# Patient Record
Sex: FEMALE | Race: WHITE | NOT HISPANIC OR LATINO | Employment: OTHER | URBAN - METROPOLITAN AREA
[De-identification: names, ages, dates, MRNs, and addresses within clinical notes are randomized per-mention and may not be internally consistent; named-entity substitution may affect disease eponyms.]

---

## 2018-06-05 ENCOUNTER — TELEPHONE (OUTPATIENT)
Dept: SCHEDULING | Facility: CLINIC | Age: 68
End: 2018-06-05

## 2018-06-06 ENCOUNTER — OFFICE VISIT (OUTPATIENT)
Dept: CARDIOLOGY | Facility: CLINIC | Age: 68
End: 2018-06-06
Payer: COMMERCIAL

## 2018-06-06 VITALS
WEIGHT: 244.4 LBS | SYSTOLIC BLOOD PRESSURE: 120 MMHG | RESPIRATION RATE: 16 BRPM | BODY MASS INDEX: 43.3 KG/M2 | DIASTOLIC BLOOD PRESSURE: 87 MMHG | HEIGHT: 63 IN | HEART RATE: 75 BPM

## 2018-06-06 DIAGNOSIS — R06.09 DYSPNEA ON EXERTION: ICD-10-CM

## 2018-06-06 DIAGNOSIS — Z01.810 PREOP CARDIOVASCULAR EXAM: ICD-10-CM

## 2018-06-06 DIAGNOSIS — R00.2 PALPITATION: Primary | ICD-10-CM

## 2018-06-06 DIAGNOSIS — I10 ESSENTIAL HYPERTENSION: ICD-10-CM

## 2018-06-06 PROCEDURE — 93000 ELECTROCARDIOGRAM COMPLETE: CPT | Performed by: INTERNAL MEDICINE

## 2018-06-06 PROCEDURE — 99215 OFFICE O/P EST HI 40 MIN: CPT | Performed by: INTERNAL MEDICINE

## 2018-06-06 RX ORDER — METFORMIN HYDROCHLORIDE 500 MG/1
500 TABLET ORAL
COMMUNITY
Start: 2017-02-24 | End: 2021-04-07

## 2018-06-06 RX ORDER — ESTRADIOL 0.1 MG/G
1 CREAM VAGINAL
COMMUNITY
Start: 2014-12-09 | End: 2019-01-23

## 2018-06-06 RX ORDER — NAPROXEN 500 MG/1
1 TABLET ORAL DAILY
Refills: 3 | COMMUNITY
Start: 2018-03-12 | End: 2024-08-07 | Stop reason: ALTCHOICE

## 2018-06-06 RX ORDER — OMEPRAZOLE 40 MG/1
1 CAPSULE, DELAYED RELEASE ORAL DAILY
Refills: 3 | COMMUNITY
Start: 2018-05-17 | End: 2019-01-23

## 2018-06-06 RX ORDER — ACETAMINOPHEN 500 MG
1 TABLET ORAL DAILY
COMMUNITY
Start: 2014-12-09

## 2018-06-06 RX ORDER — MULTIVITAMIN
TABLET ORAL
COMMUNITY
Start: 2017-01-25 | End: 2018-10-31

## 2018-06-06 RX ORDER — TAMSULOSIN HYDROCHLORIDE 0.4 MG/1
0.4 CAPSULE ORAL NIGHTLY
Refills: 2 | COMMUNITY
Start: 2018-05-07 | End: 2018-10-31

## 2018-06-06 RX ORDER — URSODIOL 250 MG/1
250 TABLET, FILM COATED ORAL DAILY
COMMUNITY
Start: 2014-12-09 | End: 2018-10-31

## 2018-06-06 RX ORDER — NITROFURANTOIN (MACROCRYSTALS) 100 MG/1
1 CAPSULE ORAL DAILY
Refills: 3 | COMMUNITY
Start: 2018-03-12 | End: 2018-10-31

## 2018-06-06 RX ORDER — ARMODAFINIL 150 MG/1
150 TABLET ORAL DAILY PRN
COMMUNITY

## 2018-06-06 RX ORDER — VITAMIN E 268 MG
1 CAPSULE ORAL DAILY
COMMUNITY
Start: 2014-12-09

## 2018-06-06 RX ORDER — FUROSEMIDE 20 MG/1
20 TABLET ORAL
Refills: 5 | COMMUNITY
Start: 2018-05-23

## 2018-06-06 RX ORDER — LOSARTAN POTASSIUM 50 MG/1
50 TABLET ORAL DAILY
COMMUNITY
Start: 2015-12-15 | End: 2018-10-31

## 2018-06-06 RX ORDER — EZETIMIBE 10 MG/1
10 TABLET ORAL
Refills: 2 | COMMUNITY
Start: 2018-03-12 | End: 2021-04-07

## 2018-06-06 RX ORDER — METHENAMINE HIPPURATE 1000 MG/1
1 TABLET ORAL EVERY EVENING
Refills: 3 | COMMUNITY
Start: 2018-05-09 | End: 2021-04-07

## 2018-06-06 NOTE — ASSESSMENT & PLAN NOTE
She is slated 6-25-18 for removal of vaginal mesh, vaginoplasty, and possible laparotomy at [facility].  As far she understands the procedure will be performed with general anesthesia and will hopefully be an outpatient procedure.  She is at low cardiovascular risk and should proceed with routine perioperative monitoring.  Her stress test and echocardiogram in 2017 showed normal left ventricular function, no aortic stenosis, and no significant ischemia.

## 2018-06-06 NOTE — PROGRESS NOTES
Cardiology Outpatient Note       Siria Mendieta is a 68 y.o. female who was seen previously in 2017 for palpitations and dyspnea on exertion.  Her symptoms essentially subsided and her cardiac testing which included an echocardiogram and stress test at that time were unremarkable.    She has had a number of urinary issues and is here today because she is slated for removal of vaginal mesh, vaginoplasty, and possible laparotomy at [facility].  Her current procedure date is 18.  In the process of being treated for her urologic issues, she was given a course of Keflex which was stopped after she had a reaction which consisted primarily of a rash on her right lower extremity.  Her skin has become more indurated.  She was concerned that she may be developing other changes in her lower extremities but she does not have pitting edema or any calf tenderness.    She still does have shortness of breath when she gets up and does things but this is unchanged and likely related to deconditioning.                                                         PMH     Medical History:  Past Medical History:   Diagnosis Date   • Diabetes (CMS/HCC) (HCC)    • Fatty liver     with cirrohsis   • Female bladder prolapse    • Fibromyalgia    • Hypertension    • Lipid disorder    • Liver fibrosis (CMS/HCC)    • Sleep apnea     CPAP       Surgical History:  Past Surgical History:   Procedure Laterality Date   • CATARACT EXTRACTION, BILATERAL     • HIP FRACTURE SURGERY Right    • HYSTEROSCOPY     • INCONTINENCE SURGERY  10/2014   • REPLACEMENT TOTAL KNEE Right        Social History: reports that she has never smoked. She has never used smokeless tobacco. She reports that she does not drink alcohol or use drugs.    Family History: indicated that her mother is . She indicated that her father is . She indicated that the status of her brother is unknown. She indicated that her other is alive.        Allergies:Acetaminophen; Aspirin; Levofloxacin; Meperidine hcl; Oxycodone hcl; Penicillins; Sulfa (sulfonamide antibiotics); and Vancomycin    Current Medications:  Current Outpatient Prescriptions:   •  armodafinil (NUVIGIL) 150 mg tablet, Take 150 mg by mouth daily as needed.  , Disp: , Rfl:   •  cholecalciferol, vitamin D3, (VITAMIN D3) 5,000 unit tablet, Take 1 tablet by mouth daily., Disp: , Rfl:   •  D-MANNOSE ORAL, Take 1 capsule by mouth daily., Disp: , Rfl:   •  ezetimibe (ZETIA) 10 mg tablet, Take 10 mg by mouth once daily., Disp: , Rfl: 2  •  furosemide (LASIX) 20 mg tablet, Take 20 mg by mouth once daily., Disp: , Rfl: 5  •  losartan (COZAAR) 50 mg tablet, Take 50 mg by mouth daily., Disp: , Rfl:   •  metFORMIN (GLUCOPHAGE) 500 mg tablet, 500 mg daily with breakfast.  , Disp: , Rfl:   •  methenamine (HIPREX) 1 gram tablet, every evening., Disp: , Rfl: 3  •  multivitamin (THERAGRAN) tablet, No SIG Entered, Disp: , Rfl:   •  naproxen (NAPROSYN) 500 mg tablet, Take 1 tablet by mouth every 12 (twelve) hours as needed., Disp: , Rfl: 3  •  nitrofurantoin (MACRODANTIN) 100 mg capsule, Take 1 capsule by mouth daily., Disp: , Rfl: 3  •  omeprazole (PriLOSEC) 40 mg capsule, Take 1 capsule by mouth daily. Before a meal, Disp: , Rfl: 3  •  tamsulosin (FLOMAX) 0.4 mg capsule, Take 0.4 mg by mouth nightly., Disp: , Rfl: 2  •  vitamin E 400 unit capsule, Take 1 capsule by mouth daily., Disp: , Rfl:   •  vortioxetine (TRINTELLIX) 5 mg tablet, Take 5 mg by mouth daily. Same time each day, Disp: , Rfl:   •  ascorbate calcium (VITAMIN C ORAL), No SIG Entered, Disp: , Rfl:   •  aspirin-acetaminophen-caffeine (EXCEDRIN EXTRA STRENGTH) 250-250-65 mg per tablet, Take 4 tablets by mouth daily., Disp: , Rfl:   •  estradiol (ESTRACE) 0.01 % (0.1 mg/gram) vaginal cream, Insert 1 g into the vagina every 14 (fourteen) days., Disp: , Rfl:   •  MILK THISTLE ORAL, No SIG Entered, Disp: , Rfl:   •  oxybutynin chloride (GELNIQUE)  "10 % (100 mg/gram) gel in packet, apply 1 sachet by topical route  every day to dry, intact skin on the abdomen, upper arms/shoulders or thighs rotating application site daily., Disp: , Rfl:   •  ursodiol (MICK 250) 250 mg tablet, Take 250 mg by mouth daily., Disp: , Rfl:                                                       OBJECTIVE   ROS   Constitution: Negative for chills and fever.   Eyes: Negative for blurred vision and visual disturbance.   Cardiovascular: Negative for chest pain, near-syncope, palpitations and syncope.   Respiratory: Negative for hemoptysis, dyspnea on exertion unchanged  Hematologic/Lymphatic: Negative for bleeding problem.   Skin: Negative for rash. No new skin changes  Gastrointestinal: Negative for abdominal pain, diarrhea, hematochezia, melena, nausea and vomiting.   Genitourinary: Negative for hematuria.   Neurological: Negative for headaches.      Vitals:    06/06/18 1441   BP: 120/87   BP Location: Left upper arm   Patient Position: Sitting   Pulse: 75   Resp: 16   Weight: 111 kg (244 lb 6.4 oz)   Height: 1.6 m (5' 3\")       BP Readings from Last 3 Encounters:   06/06/18 120/87     Wt Readings from Last 3 Encounters:   06/06/18 111 kg (244 lb 6.4 oz)      Physical Exam   Constitutional:  Oriented to person, place, and time.Well-developed and well-nourished.   HEENT:  Neck Supple.  No JVD No carotid bruits no cervical lymphadenopathy  Head: Normocephalic.   Eyes: Extraocular movements intact  Cardiovascular: Normal rate, regular rhythm and normal heart sounds.  Exam reveals no friction rub.    Pulmonary/Chest: Effort normal and breath sounds normal. No wheezes.  Abdominal: Soft and nontender. Bowel sounds are normal.   Neurological: Alert and oriented to person, place, and time.   Skin: Skin is warm and dry.  Induration skin changes right lower extremity.  No calf tenderness no pitting edema  Psychiatric: Behavior is normal.   Objective   No results found for: WBC, HGB, PLT, CHOL, " TRIG, HDL, LDLDIRECT, ALT, AST, NA, K, CREATININE, TSH, INR, GLUF, HGBA1C  Troponin I Results     No lab values to display.                                                             IMAGING   Transthoracic 2-2017  Summary  There is no prior study for comparison.  1.The left ventricle is normal size. Wall thickness is normal. Left ventricular ejection fraction is estimated at 75%. No regional wall  motion abnormalities.  2. The right ventricle is normal size with normal function.  3. Normal left atrial size.The right atrium is normal size.  4. There is mild posterior annular calcification. The mitral valve leaflets are thin and open well. There is no mitral valve prolapse. There  is trace mitral regurgitation.  5. The aortic root is normal size. There is no aortic regurgitation. The aortic valve is not well seen. The aortic valve opens adequately.  There is no significant aortic stenosis.  6. The tricuspid valve is not well seen but is grossly normal. There is trace tricuspid regurgitation. Estimated RVSP is 27 mmHg.  ·      Pharmacologic nuclear stress test 2-2017  Small area of decreased isotope uptake in the apex on resting images only consistent with artifact. Stress perfusion imaging is normal  without evidence of significant ischemia. Left ventricular function is hyperdynamic greater than 75%.         EKG   Sinus  Rhythm 75bpm  WITHIN NORMAL LIMITS                                           ASSESSMENT AND PLAN   Ms. Mendieta is a 68-year-old female with the following issues:        Assessment/Plan    Preop cardiovascular exam  She is slated 6-25-18 for removal of vaginal mesh, vaginoplasty, and possible laparotomy at [facility].  As far she understands the procedure will be performed with general anesthesia and will hopefully be an outpatient procedure.  She is at low cardiovascular risk and should proceed with routine perioperative monitoring.  Her stress test and echocardiogram in 2017  showed normal left ventricular function, no aortic stenosis, and no significant ischemia.    Essential hypertension  Continue with losartan 50 mg daily and Lasix 20 mg daily.    Dyspnea on exertion  This is partly related to deconditioning.  Her presentation is not suggestive of an acute coronary syndrome or congestive heart failure.  Recommend risk factor modification.  Patient cannot tolerate statin therapy due to her liver cirrhosis.             She will return to the office on an annual basis.  Thank you for allowing me to participate in the care of this patient.  I hope this information is helpful.     Lizette Sifuentes MD [facility]  6/6/2018  5:09 PM

## 2018-06-06 NOTE — LETTER
June 6, 2018     Johnny Gates, DO  [address]  [place] [address]    Patient: Siria Mendieta   YOB: 1950   Date of Visit: 6/6/2018       Dear Dr. aGtes:    Thank you for referring Siria Mendieta to me for evaluation. Below are my notes for this consultation.    If you have questions, please do not hesitate to call me. I look forward to following your patient along with you.         Sincerely,        Lizette Sifuentes MD        CC: MD Lizette Coates MD  6/6/2018  5:11 PM  Signed       Cardiology Outpatient Note       Siria Mendieta is a 68 y.o. female who was seen previously in 2017 for palpitations and dyspnea on exertion.  Her symptoms essentially subsided and her cardiac testing which included an echocardiogram and stress test at that time were unremarkable.    She has had a number of urinary issues and is here today because she is slated for removal of vaginal mesh, vaginoplasty, and possible laparotomy at [facility].  Her current procedure date is 6-25-18.  In the process of being treated for her urologic issues, she was given a course of Keflex which was stopped after she had a reaction which consisted primarily of a rash on her right lower extremity.  Her skin has become more indurated.  She was concerned that she may be developing other changes in her lower extremities but she does not have pitting edema or any calf tenderness.    She still does have shortness of breath when she gets up and does things but this is unchanged and likely related to deconditioning.                                                         PMH     Medical History:  Past Medical History:   Diagnosis Date   • Diabetes (CMS/HCC) (HCC)    • Fatty liver     with cirrohsis   • Female bladder prolapse    • Fibromyalgia    • Hypertension    • Lipid disorder    • Liver fibrosis (CMS/HCC)    • Sleep apnea     CPAP       Surgical History:  Past Surgical History:   Procedure Laterality  Date   • CATARACT EXTRACTION, BILATERAL     • HIP FRACTURE SURGERY Right    • HYSTEROSCOPY     • INCONTINENCE SURGERY  10/2014   • REPLACEMENT TOTAL KNEE Right        Social History: reports that she has never smoked. She has never used smokeless tobacco. She reports that she does not drink alcohol or use drugs.    Family History: indicated that her mother is . She indicated that her father is . She indicated that the status of her brother is unknown. She indicated that her other is alive.       Allergies:Acetaminophen; Aspirin; Levofloxacin; Meperidine hcl; Oxycodone hcl; Penicillins; Sulfa (sulfonamide antibiotics); and Vancomycin    Current Medications:  Current Outpatient Prescriptions:   •  armodafinil (NUVIGIL) 150 mg tablet, Take 150 mg by mouth daily as needed.  , Disp: , Rfl:   •  cholecalciferol, vitamin D3, (VITAMIN D3) 5,000 unit tablet, Take 1 tablet by mouth daily., Disp: , Rfl:   •  D-MANNOSE ORAL, Take 1 capsule by mouth daily., Disp: , Rfl:   •  ezetimibe (ZETIA) 10 mg tablet, Take 10 mg by mouth once daily., Disp: , Rfl: 2  •  furosemide (LASIX) 20 mg tablet, Take 20 mg by mouth once daily., Disp: , Rfl: 5  •  losartan (COZAAR) 50 mg tablet, Take 50 mg by mouth daily., Disp: , Rfl:   •  metFORMIN (GLUCOPHAGE) 500 mg tablet, 500 mg daily with breakfast.  , Disp: , Rfl:   •  methenamine (HIPREX) 1 gram tablet, every evening., Disp: , Rfl: 3  •  multivitamin (THERAGRAN) tablet, No SIG Entered, Disp: , Rfl:   •  naproxen (NAPROSYN) 500 mg tablet, Take 1 tablet by mouth every 12 (twelve) hours as needed., Disp: , Rfl: 3  •  nitrofurantoin (MACRODANTIN) 100 mg capsule, Take 1 capsule by mouth daily., Disp: , Rfl: 3  •  omeprazole (PriLOSEC) 40 mg capsule, Take 1 capsule by mouth daily. Before a meal, Disp: , Rfl: 3  •  tamsulosin (FLOMAX) 0.4 mg capsule, Take 0.4 mg by mouth nightly., Disp: , Rfl: 2  •  vitamin E 400 unit capsule, Take 1 capsule by mouth daily., Disp: , Rfl:   •   "vortioxetine (TRINTELLIX) 5 mg tablet, Take 5 mg by mouth daily. Same time each day, Disp: , Rfl:   •  ascorbate calcium (VITAMIN C ORAL), No SIG Entered, Disp: , Rfl:   •  aspirin-acetaminophen-caffeine (EXCEDRIN EXTRA STRENGTH) 250-250-65 mg per tablet, Take 4 tablets by mouth daily., Disp: , Rfl:   •  estradiol (ESTRACE) 0.01 % (0.1 mg/gram) vaginal cream, Insert 1 g into the vagina every 14 (fourteen) days., Disp: , Rfl:   •  MILK THISTLE ORAL, No SIG Entered, Disp: , Rfl:   •  oxybutynin chloride (GELNIQUE) 10 % (100 mg/gram) gel in packet, apply 1 sachet by topical route  every day to dry, intact skin on the abdomen, upper arms/shoulders or thighs rotating application site daily., Disp: , Rfl:   •  ursodiol (MICK 250) 250 mg tablet, Take 250 mg by mouth daily., Disp: , Rfl:                                                       OBJECTIVE   ROS   Constitution: Negative for chills and fever.   Eyes: Negative for blurred vision and visual disturbance.   Cardiovascular: Negative for chest pain, near-syncope, palpitations and syncope.   Respiratory: Negative for hemoptysis, dyspnea on exertion unchanged  Hematologic/Lymphatic: Negative for bleeding problem.   Skin: Negative for rash. No new skin changes  Gastrointestinal: Negative for abdominal pain, diarrhea, hematochezia, melena, nausea and vomiting.   Genitourinary: Negative for hematuria.   Neurological: Negative for headaches.      Vitals:    06/06/18 1441   BP: 120/87   BP Location: Left upper arm   Patient Position: Sitting   Pulse: 75   Resp: 16   Weight: 111 kg (244 lb 6.4 oz)   Height: 1.6 m (5' 3\")       BP Readings from Last 3 Encounters:   06/06/18 120/87     Wt Readings from Last 3 Encounters:   06/06/18 111 kg (244 lb 6.4 oz)      Physical Exam   Constitutional:  Oriented to person, place, and time.Well-developed and well-nourished.   HEENT:  Neck Supple.  No JVD No carotid bruits no cervical lymphadenopathy  Head: Normocephalic.   Eyes: Extraocular " movements intact  Cardiovascular: Normal rate, regular rhythm and normal heart sounds.  Exam reveals no friction rub.    Pulmonary/Chest: Effort normal and breath sounds normal. No wheezes.  Abdominal: Soft and nontender. Bowel sounds are normal.   Neurological: Alert and oriented to person, place, and time.   Skin: Skin is warm and dry.  Induration skin changes right lower extremity.  No calf tenderness no pitting edema  Psychiatric: Behavior is normal.   Objective   No results found for: WBC, HGB, PLT, CHOL, TRIG, HDL, LDLDIRECT, ALT, AST, NA, K, CREATININE, TSH, INR, GLUF, HGBA1C  Troponin I Results     No lab values to display.                                                             IMAGING   Transthoracic 2-2017  Summary  There is no prior study for comparison.  1.The left ventricle is normal size. Wall thickness is normal. Left ventricular ejection fraction is estimated at 75%. No regional wall  motion abnormalities.  2. The right ventricle is normal size with normal function.  3. Normal left atrial size.The right atrium is normal size.  4. There is mild posterior annular calcification. The mitral valve leaflets are thin and open well. There is no mitral valve prolapse. There  is trace mitral regurgitation.  5. The aortic root is normal size. There is no aortic regurgitation. The aortic valve is not well seen. The aortic valve opens adequately.  There is no significant aortic stenosis.  6. The tricuspid valve is not well seen but is grossly normal. There is trace tricuspid regurgitation. Estimated RVSP is 27 mmHg.  ·      Pharmacologic nuclear stress test 2-2017  Small area of decreased isotope uptake in the apex on resting images only consistent with artifact. Stress perfusion imaging is normal  without evidence of significant ischemia. Left ventricular function is hyperdynamic greater than 75%.         EKG   Sinus  Rhythm 75bpm  WITHIN NORMAL LIMITS                                           ASSESSMENT  AND PLAN   Ms. Mendieta is a 68-year-old female with the following issues:        Assessment/Plan    Preop cardiovascular exam  She is slated 6-25-18 for removal of vaginal mesh, vaginoplasty, and possible laparotomy at [facility].  As far she understands the procedure will be performed with general anesthesia and will hopefully be an outpatient procedure.  She is at low cardiovascular risk and should proceed with routine perioperative monitoring.  Her stress test and echocardiogram in 2017 showed normal left ventricular function, no aortic stenosis, and no significant ischemia.    Essential hypertension  Continue with losartan 50 mg daily and Lasix 20 mg daily.    Dyspnea on exertion  This is partly related to deconditioning.  Her presentation is not suggestive of an acute coronary syndrome or congestive heart failure.  Recommend risk factor modification.  Patient cannot tolerate statin therapy due to her liver cirrhosis.             She will return to the office on an annual basis.  Thank you for allowing me to participate in the care of this patient.  I hope this information is helpful.     Lizette Sifuentes MD [facility]  6/6/2018  5:09 PM

## 2018-06-06 NOTE — ASSESSMENT & PLAN NOTE
This is partly related to deconditioning.  Her presentation is not suggestive of an acute coronary syndrome or congestive heart failure.  Recommend risk factor modification.  Patient cannot tolerate statin therapy due to her liver cirrhosis.

## 2018-06-22 ENCOUNTER — TELEPHONE (OUTPATIENT)
Dept: SCHEDULING | Facility: CLINIC | Age: 68
End: 2018-06-22

## 2018-06-22 NOTE — TELEPHONE ENCOUNTER
Malika from Dr. Maria E Menendez's Office requesting EKG, lab work , and cardiac testing.     F- [phone]  6/25/18 surgery

## 2018-10-31 ENCOUNTER — OFFICE VISIT (OUTPATIENT)
Dept: CARDIOLOGY | Facility: CLINIC | Age: 68
End: 2018-10-31
Payer: COMMERCIAL

## 2018-10-31 VITALS
DIASTOLIC BLOOD PRESSURE: 82 MMHG | RESPIRATION RATE: 16 BRPM | BODY MASS INDEX: 41.04 KG/M2 | HEART RATE: 58 BPM | WEIGHT: 231.6 LBS | HEIGHT: 63 IN | SYSTOLIC BLOOD PRESSURE: 154 MMHG

## 2018-10-31 DIAGNOSIS — I10 ESSENTIAL HYPERTENSION: Primary | ICD-10-CM

## 2018-10-31 PROCEDURE — 93000 ELECTROCARDIOGRAM COMPLETE: CPT | Performed by: INTERNAL MEDICINE

## 2018-10-31 PROCEDURE — 99214 OFFICE O/P EST MOD 30 MIN: CPT | Performed by: INTERNAL MEDICINE

## 2018-10-31 RX ORDER — METOPROLOL TARTRATE 25 MG/1
12.5 TABLET, FILM COATED ORAL
Refills: 3 | COMMUNITY
Start: 2018-09-25 | End: 2020-01-24

## 2018-10-31 RX ORDER — ESOMEPRAZOLE MAGNESIUM 40 MG/1
CAPSULE, DELAYED RELEASE ORAL
Refills: 3 | COMMUNITY
Start: 2018-09-25 | End: 2021-04-07

## 2018-10-31 RX ORDER — NYSTATIN 100000 U/G
CREAM TOPICAL
Refills: 3 | COMMUNITY
Start: 2018-10-08 | End: 2021-04-07

## 2018-10-31 RX ORDER — LOSARTAN POTASSIUM 100 MG/1
100 TABLET ORAL DAILY
Qty: 30 TABLET | Refills: 6 | Status: SHIPPED | OUTPATIENT
Start: 2018-10-31 | End: 2019-06-04 | Stop reason: SDUPTHER

## 2018-10-31 RX ORDER — OXYBUTYNIN CHLORIDE 15 MG/1
15 TABLET, EXTENDED RELEASE ORAL DAILY
Refills: 3 | COMMUNITY
Start: 2018-08-01

## 2018-10-31 NOTE — LETTER
October 31, 2018     oJhnny Gates, DO  [address] [facility]  [place] [address]    Patient: Siria Mendieta   YOB: 1950   Date of Visit: 10/31/2018       Dear Dr. Gates:    Thank you for referring Siria Mendieta to me for evaluation. Below are my notes for this consultation.    If you have questions, please do not hesitate to call me. I look forward to following your patient along with you.         Sincerely,        Lizette Sifuentes MD        CC: No Recipients  Lizette Sifuentes MD  10/31/2018  1:13 PM  Signed       Cardiology Outpatient Note       Siria Mendieta is a 68 y.o. female who was seen previously in 2017 for palpitations and dyspnea on exertion.  Her symptoms essentially subsided and her cardiac testing which included an echocardiogram and stress test at that time were unremarkable.    She has had a number of urinary issues and is here today because she is slated for removal of vaginal mesh, vaginoplasty, and possible laparotomy at [facility].  She had her procedure 6-2018.  Subsequently 9-2018 she had to go back for another procedure.  During her first procedure she was told to see her cardiologist as an outpatient because she had atrial fibrillation.  She was seen by cardiologist during that admission but does not remember many of the details.  She was not placed on oral anticoagulation.  She was started on metoprolol 12.5 mg twice daily.  She did not have atrial fibrillation during her second procedure 9-2018.  Per her description she appeared to have intraoperative atrial fibrillation, but nothing significant postoperatively.   I have no records for direct review.  She has seen her surgeon a few times after after that, her vitals have been checked, she has not had atrial fibrillation as far she knows.  She denies any palpitations, shortness of breath, or chest pain.  She currently has a suprapubic catheter in place.  She has an upcoming appointment in November to see if it can be  removed.  Since her surgery, her blood pressure has been more labile.                                                         [facility]     Medical History:  Past Medical History:   Diagnosis Date   • Abnormal ECG    • Diabetes (CMS/HCC) (HCC)    • Fatty liver     with cirrohsis   • Female bladder prolapse    • Fibromyalgia    • Hypertension    • Lipid disorder    • Liver fibrosis    • Sleep apnea     CPAP       Surgical History:  Past Surgical History:   Procedure Laterality Date   • BLADDER SURGERY  2018   • CATARACT EXTRACTION, BILATERAL     • HIP FRACTURE SURGERY Right    • HYSTEROSCOPY     • INCONTINENCE SURGERY  10/2014   • REPLACEMENT TOTAL KNEE Right        Social History: reports that she has never smoked. She has never used smokeless tobacco. She reports that she does not drink alcohol or use drugs.    Family History: indicated that her mother is . She indicated that her father is . She indicated that the status of her brother is unknown. She indicated that her other is alive.       Allergies:Acetaminophen; Aspirin; Keflex [cephalexin]; Levofloxacin; Meperidine hcl; Oxycodone hcl; Penicillins; Sulfa (sulfonamide antibiotics); and Vancomycin    Current Medications:  Current Outpatient Prescriptions:   •  armodafinil (NUVIGIL) 150 mg tablet, Take 150 mg by mouth daily as needed.  , Disp: , Rfl:   •  aspirin/caffeine (KAYLEEN BACK AND BODY ORAL), Take 1 tablet by mouth 2 (two) times a day as needed., Disp: , Rfl:   •  cholecalciferol, vitamin D3, (VITAMIN D3) 5,000 unit tablet, Take 1 tablet by mouth daily., Disp: , Rfl:   •  esomeprazole (NexIUM) 40 mg capsule, Take by mouth once daily., Disp: , Rfl: 3  •  estradiol (ESTRACE) 0.01 % (0.1 mg/gram) vaginal cream, Insert 1 g into the vagina every 14 (fourteen) days., Disp: , Rfl:   •  ezetimibe (ZETIA) 10 mg tablet, Take 10 mg by mouth once daily., Disp: , Rfl: 2  •  furosemide (LASIX) 20 mg tablet, Take 20 mg by mouth once daily., Disp: , Rfl:  "5  •  metFORMIN (GLUCOPHAGE) 500 mg tablet, 500 mg daily with breakfast.  , Disp: , Rfl:   •  methenamine (HIPREX) 1 gram tablet, Take 1 g by mouth every evening.  , Disp: , Rfl: 3  •  metoprolol tartrate (LOPRESSOR) 25 mg tablet, Take 12.5 mg by mouth 2 (two) times a day., Disp: , Rfl: 3  •  naproxen (NAPROSYN) 500 mg tablet, Take 1 tablet by mouth every 12 (twelve) hours as needed., Disp: , Rfl: 3  •  nystatin (MYCOSTATIN) 100,000 unit/gram cream, APPLY THIN FILM TO ABDOMEN TWICE DAILY, Disp: , Rfl: 3  •  oxybutynin (DITROPAN) 5 mg tablet, Take 5 mg by mouth 3 (three) times a day., Disp: , Rfl: 3  •  vitamin E 400 unit capsule, Take 1 capsule by mouth daily., Disp: , Rfl:   •  vortioxetine (TRINTELLIX) 5 mg tablet, Take 5 mg by mouth daily. Same time each day, Disp: , Rfl:   •  losartan (COZAAR) 100 mg tablet, Take 1 tablet (100 mg total) by mouth daily., Disp: 30 tablet, Rfl: 6  •  omeprazole (PriLOSEC) 40 mg capsule, Take 1 capsule by mouth daily. Before a meal, Disp: , Rfl: 3                                                      OBJECTIVE   ROS   Constitution: Negative for chills and fever.   Eyes: Negative for blurred vision and visual disturbance.   Cardiovascular: Negative for chest pain, near-syncope, palpitations and syncope.   Respiratory: Negative for hemoptysis, dyspnea on exertion unchanged  Hematologic/Lymphatic: Negative for bleeding problem.   Skin: Negative for rash. No new skin changes  Gastrointestinal: Negative for abdominal pain,  melena, nausea and vomiting.   Genitourinary: Negative for hematuria.   Neurological: Negative for headaches.      Vitals:    10/31/18 1142   BP: (!) 154/82   BP Location: Right upper arm   Patient Position: Sitting   Pulse: (!) 58   Resp: 16   Weight: 105 kg (231 lb 9.6 oz)   Height: 1.6 m (5' 3\")       BP Readings from Last 3 Encounters:   10/31/18 (!) 154/82   06/06/18 120/87     Wt Readings from Last 3 Encounters:   10/31/18 105 kg (231 lb 9.6 oz)   06/06/18 111 kg " (244 lb 6.4 oz)      Physical Exam   Constitutional:  Oriented to person, place, and time.Well-developed and well-nourished.   HEENT:  Neck Supple.  No JVD   Head: Normocephalic.   Eyes: Extraocular movements intact  Cardiovascular: Normal rate, regular rhythm and normal heart sounds.  Exam reveals no friction rub.    Pulmonary/Chest: Effort normal and breath sounds normal. No wheezes.  Abdominal: Soft and nontender. Bowel sounds are normal.  Suprapubic catheter in place  Neurological: Alert and oriented to person, place, and time.   Skin: Skin is warm and dry. no pitting edema  Psychiatric: Behavior is normal.   Objective   No results found for: WBC, HGB, PLT, CHOL, TRIG, HDL, LDLDIRECT, ALT, AST, NA, K, CREATININE, TSH, INR, HGBA1C  Troponin I Results     No lab values to display.                                                             IMAGING   Transthoracic 2-2017  Summary  There is no prior study for comparison.  1.The left ventricle is normal size. Wall thickness is normal. Left ventricular ejection fraction is estimated at 75%. No regional wall  motion abnormalities.  2. The right ventricle is normal size with normal function.  3. Normal left atrial size.The right atrium is normal size.  4. There is mild posterior annular calcification. The mitral valve leaflets are thin and open well. There is no mitral valve prolapse. There  is trace mitral regurgitation.  5. The aortic root is normal size. There is no aortic regurgitation. The aortic valve is not well seen. The aortic valve opens adequately.  There is no significant aortic stenosis.  6. The tricuspid valve is not well seen but is grossly normal. There is trace tricuspid regurgitation. Estimated RVSP is 27 mmHg.  ·      Pharmacologic nuclear stress test 2-2017  Small area of decreased isotope uptake in the apex on resting images only consistent with artifact. Stress perfusion imaging is normal  without evidence of significant ischemia. Left ventricular  function is hyperdynamic greater than 75%.         EKG   Sinus  Bradycardia 58bpm   -First degree A-V block   Quentin = 258                                             ASSESSMENT AND PLAN   Ms. Mendieta is a 68-year-old female with the following issues:        Assessment/Plan    Essential hypertension  Continue with losartan 50 mg daily and Lasix 20 mg daily.  He is also on metoprolol 12.5 mg twice daily    Dyspnea on exertion  This is partly related to deconditioning.  Her presentation is not suggestive of an acute coronary syndrome or congestive heart failure.  Recommend risk factor modification.  Patient cannot tolerate statin therapy due to her liver cirrhosis.    Preop cardiovascular exam  During her surgery 6-2018 which included removal of vaginal mesh, vaginal plasty, and supra pubic catheter placement, she appears to have developed intraoperative atrial fibrillation but did not have it postoperatively.  We will try to obtain records.  She was not discharged on anticoagulation.    She will continue with aspirin daily.  She has been scheduled for an echocardiogram and Holter monitor.  She is currently asymptomatic and in sinus rhythm.  She will continue with metoprolol 12.5 mg twice daily.               Thank you for allowing me to participate in the care of this patient.  I hope this information is helpful.     Lizette Sifuentes MD [facility]  10/31/2018  1:12 PM

## 2018-10-31 NOTE — ASSESSMENT & PLAN NOTE
During her surgery 6-2018 which included removal of vaginal mesh, vaginal plasty, and supra pubic catheter placement, she appears to have developed intraoperative atrial fibrillation but did not have it postoperatively.  We will try to obtain records.  She was not discharged on anticoagulation.    She will continue with aspirin daily.  She has been scheduled for an echocardiogram and Holter monitor.  She is currently asymptomatic and in sinus rhythm.  She will continue with metoprolol 12.5 mg twice daily.

## 2018-10-31 NOTE — PROGRESS NOTES
Cardiology Outpatient Note       Siria Mendieta is a 68 y.o. female who was seen previously in 2017 for palpitations and dyspnea on exertion.  Her symptoms essentially subsided and her cardiac testing which included an echocardiogram and stress test at that time were unremarkable.    She has had a number of urinary issues and is here today because she is slated for removal of vaginal mesh, vaginoplasty, and possible laparotomy at [facility].  She had her procedure 6-2018.  Subsequently 9-2018 she had to go back for another procedure.  During her first procedure she was told to see her cardiologist as an outpatient because she had atrial fibrillation.  She was seen by cardiologist during that admission but does not remember many of the details.  She was not placed on oral anticoagulation.  She was started on metoprolol 12.5 mg twice daily.  She did not have atrial fibrillation during her second procedure 9-2018.  Per her description she appeared to have intraoperative atrial fibrillation, but nothing significant postoperatively.   I have no records for direct review.  She has seen her surgeon a few times after after that, her vitals have been checked, she has not had atrial fibrillation as far she knows.  She denies any palpitations, shortness of breath, or chest pain.  She currently has a suprapubic catheter in place.  She has an upcoming appointment in November to see if it can be removed.  Since her surgery, her blood pressure has been more labile.                                                         [facility]     Medical History:  Past Medical History:   Diagnosis Date   • Abnormal ECG    • Diabetes (CMS/HCC) (HCC)    • Fatty liver     with cirrohsis   • Female bladder prolapse    • Fibromyalgia    • Hypertension    • Lipid disorder    • Liver fibrosis    • Sleep apnea     CPAP       Surgical History:  Past Surgical History:   Procedure Laterality Date   • BLADDER SURGERY  06/25/2018   •  CATARACT EXTRACTION, BILATERAL     • HIP FRACTURE SURGERY Right    • HYSTEROSCOPY     • INCONTINENCE SURGERY  10/2014   • REPLACEMENT TOTAL KNEE Right        Social History: reports that she has never smoked. She has never used smokeless tobacco. She reports that she does not drink alcohol or use drugs.    Family History: indicated that her mother is . She indicated that her father is . She indicated that the status of her brother is unknown. She indicated that her other is alive.       Allergies:Acetaminophen; Aspirin; Keflex [cephalexin]; Levofloxacin; Meperidine hcl; Oxycodone hcl; Penicillins; Sulfa (sulfonamide antibiotics); and Vancomycin    Current Medications:  Current Outpatient Prescriptions:   •  armodafinil (NUVIGIL) 150 mg tablet, Take 150 mg by mouth daily as needed.  , Disp: , Rfl:   •  aspirin/caffeine (KAYLEEN BACK AND BODY ORAL), Take 1 tablet by mouth 2 (two) times a day as needed., Disp: , Rfl:   •  cholecalciferol, vitamin D3, (VITAMIN D3) 5,000 unit tablet, Take 1 tablet by mouth daily., Disp: , Rfl:   •  esomeprazole (NexIUM) 40 mg capsule, Take by mouth once daily., Disp: , Rfl: 3  •  estradiol (ESTRACE) 0.01 % (0.1 mg/gram) vaginal cream, Insert 1 g into the vagina every 14 (fourteen) days., Disp: , Rfl:   •  ezetimibe (ZETIA) 10 mg tablet, Take 10 mg by mouth once daily., Disp: , Rfl: 2  •  furosemide (LASIX) 20 mg tablet, Take 20 mg by mouth once daily., Disp: , Rfl: 5  •  metFORMIN (GLUCOPHAGE) 500 mg tablet, 500 mg daily with breakfast.  , Disp: , Rfl:   •  methenamine (HIPREX) 1 gram tablet, Take 1 g by mouth every evening.  , Disp: , Rfl: 3  •  metoprolol tartrate (LOPRESSOR) 25 mg tablet, Take 12.5 mg by mouth 2 (two) times a day., Disp: , Rfl: 3  •  naproxen (NAPROSYN) 500 mg tablet, Take 1 tablet by mouth every 12 (twelve) hours as needed., Disp: , Rfl: 3  •  nystatin (MYCOSTATIN) 100,000 unit/gram cream, APPLY THIN FILM TO ABDOMEN TWICE DAILY, Disp: , Rfl: 3  •   "oxybutynin (DITROPAN) 5 mg tablet, Take 5 mg by mouth 3 (three) times a day., Disp: , Rfl: 3  •  vitamin E 400 unit capsule, Take 1 capsule by mouth daily., Disp: , Rfl:   •  vortioxetine (TRINTELLIX) 5 mg tablet, Take 5 mg by mouth daily. Same time each day, Disp: , Rfl:   •  losartan (COZAAR) 100 mg tablet, Take 1 tablet (100 mg total) by mouth daily., Disp: 30 tablet, Rfl: 6  •  omeprazole (PriLOSEC) 40 mg capsule, Take 1 capsule by mouth daily. Before a meal, Disp: , Rfl: 3                                                      OBJECTIVE   ROS   Constitution: Negative for chills and fever.   Eyes: Negative for blurred vision and visual disturbance.   Cardiovascular: Negative for chest pain, near-syncope, palpitations and syncope.   Respiratory: Negative for hemoptysis, dyspnea on exertion unchanged  Hematologic/Lymphatic: Negative for bleeding problem.   Skin: Negative for rash. No new skin changes  Gastrointestinal: Negative for abdominal pain,  melena, nausea and vomiting.   Genitourinary: Negative for hematuria.   Neurological: Negative for headaches.      Vitals:    10/31/18 1142   BP: (!) 154/82   BP Location: Right upper arm   Patient Position: Sitting   Pulse: (!) 58   Resp: 16   Weight: 105 kg (231 lb 9.6 oz)   Height: 1.6 m (5' 3\")       BP Readings from Last 3 Encounters:   10/31/18 (!) 154/82   06/06/18 120/87     Wt Readings from Last 3 Encounters:   10/31/18 105 kg (231 lb 9.6 oz)   06/06/18 111 kg (244 lb 6.4 oz)      Physical Exam   Constitutional:  Oriented to person, place, and time.Well-developed and well-nourished.   HEENT:  Neck Supple.  No JVD   Head: Normocephalic.   Eyes: Extraocular movements intact  Cardiovascular: Normal rate, regular rhythm and normal heart sounds.  Exam reveals no friction rub.    Pulmonary/Chest: Effort normal and breath sounds normal. No wheezes.  Abdominal: Soft and nontender. Bowel sounds are normal.  Suprapubic catheter in place  Neurological: Alert and oriented to " person, place, and time.   Skin: Skin is warm and dry. no pitting edema  Psychiatric: Behavior is normal.   Objective   No results found for: WBC, HGB, PLT, CHOL, TRIG, HDL, LDLDIRECT, ALT, AST, NA, K, CREATININE, TSH, INR, HGBA1C  Troponin I Results     No lab values to display.                                                             IMAGING   Transthoracic 2-2017  Summary  There is no prior study for comparison.  1.The left ventricle is normal size. Wall thickness is normal. Left ventricular ejection fraction is estimated at 75%. No regional wall  motion abnormalities.  2. The right ventricle is normal size with normal function.  3. Normal left atrial size.The right atrium is normal size.  4. There is mild posterior annular calcification. The mitral valve leaflets are thin and open well. There is no mitral valve prolapse. There  is trace mitral regurgitation.  5. The aortic root is normal size. There is no aortic regurgitation. The aortic valve is not well seen. The aortic valve opens adequately.  There is no significant aortic stenosis.  6. The tricuspid valve is not well seen but is grossly normal. There is trace tricuspid regurgitation. Estimated RVSP is 27 mmHg.  ·      Pharmacologic nuclear stress test 2-2017  Small area of decreased isotope uptake in the apex on resting images only consistent with artifact. Stress perfusion imaging is normal  without evidence of significant ischemia. Left ventricular function is hyperdynamic greater than 75%.         EKG   Sinus  Bradycardia 58bpm   -First degree A-V block   Quentin = 258                                             ASSESSMENT AND PLAN   Ms. Mendieta is a 68-year-old female with the following issues:        Assessment/Plan    Essential hypertension  Continue with losartan 50 mg daily and Lasix 20 mg daily.  He is also on metoprolol 12.5 mg twice daily    Dyspnea on exertion  This is partly related to deconditioning.  Her presentation is not suggestive of an  acute coronary syndrome or congestive heart failure.  Recommend risk factor modification.  Patient cannot tolerate statin therapy due to her liver cirrhosis.    Preop cardiovascular exam  During her surgery 6-2018 which included removal of vaginal mesh, vaginal plasty, and supra pubic catheter placement, she appears to have developed intraoperative atrial fibrillation but did not have it postoperatively.  We will try to obtain records.  She was not discharged on anticoagulation.    She will continue with aspirin daily.  She has been scheduled for an echocardiogram and Holter monitor.  She is currently asymptomatic and in sinus rhythm.  She will continue with metoprolol 12.5 mg twice daily.               Thank you for allowing me to participate in the care of this patient.  I hope this information is helpful.     Lizette Sifuentes MD [facility]  10/31/2018  1:12 PM

## 2018-10-31 NOTE — ASSESSMENT & PLAN NOTE
Continue with losartan 50 mg daily and Lasix 20 mg daily.  He is also on metoprolol 12.5 mg twice daily

## 2018-11-07 ENCOUNTER — TELEPHONE (OUTPATIENT)
Dept: CARDIOLOGY | Facility: CLINIC | Age: 68
End: 2018-11-07

## 2018-11-07 NOTE — TELEPHONE ENCOUNTER
I called and spoke with patient who verbalized understanding that her holter monitor did not show any AFIB. She thanks you very much for the update!

## 2019-01-10 ENCOUNTER — TELEPHONE (OUTPATIENT)
Dept: SCHEDULING | Facility: CLINIC | Age: 69
End: 2019-01-10

## 2019-01-10 NOTE — TELEPHONE ENCOUNTER
Pt cx. appt for Echo and OV in [place]  w/ Thanh Olivo  1/11/19 please call pt to [facility]. P# [phone]

## 2019-01-23 ENCOUNTER — OFFICE VISIT (OUTPATIENT)
Dept: CARDIOLOGY | Facility: CLINIC | Age: 69
End: 2019-01-23
Payer: COMMERCIAL

## 2019-01-23 ENCOUNTER — HOSPITAL ENCOUNTER (OUTPATIENT)
Dept: CARDIOLOGY | Facility: CLINIC | Age: 69
Discharge: HOME | End: 2019-01-23
Attending: INTERNAL MEDICINE
Payer: COMMERCIAL

## 2019-01-23 VITALS
DIASTOLIC BLOOD PRESSURE: 85 MMHG | HEIGHT: 63 IN | RESPIRATION RATE: 16 BRPM | SYSTOLIC BLOOD PRESSURE: 130 MMHG | BODY MASS INDEX: 41.57 KG/M2 | WEIGHT: 234.6 LBS | HEART RATE: 59 BPM

## 2019-01-23 VITALS
DIASTOLIC BLOOD PRESSURE: 82 MMHG | HEIGHT: 63 IN | BODY MASS INDEX: 41.57 KG/M2 | WEIGHT: 234.6 LBS | SYSTOLIC BLOOD PRESSURE: 154 MMHG

## 2019-01-23 DIAGNOSIS — I10 ESSENTIAL HYPERTENSION: Primary | ICD-10-CM

## 2019-01-23 PROBLEM — I48.0 PAF (PAROXYSMAL ATRIAL FIBRILLATION) (CMS/HCC): Status: ACTIVE | Noted: 2019-01-23

## 2019-01-23 LAB
AORTIC ROOT ANNULUS - M-MODE: 3.7 CM
ASCENDING AORTA: 2.9 CM
AV PEAK GRADIENT: 10 MMHG
AV PEAK VELOCITY-S: 1.56 M/S
AV VALVE AREA: 2.4 CM2
BSA FOR ECHO PROCEDURE: 2.17 M2
CUSP SEPARATION: 2.3 CM
DOP CALC LVOT STROKE VOLUME: 96.59 ML
E WAVE DECELERATION TIME: 299 MS
E/A RATIO: 1.2
E/E' RATIO: 11.4
E/LAT E' RATIO: 10.7
FRACTIONAL SHORTENING: 31.3 %
LA ESV (BP): 93.8 CM3
LA ESV INDEX (A2C): 39.54 CM3/M2
LA ESV INDEX (BP): 43.23 CM3/M2
LA/AORTA RATIO: 0.81
LAAS-AP2: 27 CM2
LAAS-AP4: 28.5 CM2
LAD 2D - M-MODE: 3 CM
LAD 2D - M-MODE: 3 CM
LAD 2D: 2.4 CM
LALD A4C: 6.48 CM
LALD A4C: 7.31 CM
LAV-S: 85.8 CM3
LEFT ATRIUM VOLUME INDEX: 42.35 CM3/M2
LEFT ATRIUM VOLUME: 91.9 CM3
LEFT INTERNAL DIMENSION IN SYSTOLE: 3.54 CM (ref 3.13–4.74)
LEFT VENTRICULAR INTERNAL DIMENSION IN DIASTOLE: 5.15 CM (ref 5.34–7.41)
LEFT VENTRICULAR POSTERIOR WALL IN END DIASTOLE: 0.81 CM (ref 0.68–1.27)
LVOT 2D: 2.1 CM
LVOT A: 3.46 CM2
LVOT MG: 3 MMHG
LVOT MV: 0.79 M/S
LVOT PEAK VELOCITY: 1.08 M/S
LVOT PG: 5 MMHG
LVOT VTI: 27.9 CM
MV E'TISSUE VEL-LAT: 0.08 M/S
MV E'TISSUE VEL-MED: 0.08 M/S
MV PEAK A VEL: 0.7 M/S
MV PEAK E VEL: 0.86 M/S
POSTERIOR WALL: 0.81 CM
PV PEAK GRADIENT: 4 MMHG
PV PV: 0.98 M/S
RVOT VMAX: 0.62 M/S
RVOT VTI: 16.3 CM
SEPTAL TISSUE DOPPLER FREE WALL LATE DIA VELOCITY (APICAL 4 CHAMBER VIEW): 0.14 M/S
Z-SCORE OF LEFT VENTRICULAR DIMENSION IN END DIASTOLE: -2
Z-SCORE OF LEFT VENTRICULAR DIMENSION IN END SYSTOLE: -0.66
Z-SCORE OF LEFT VENTRICULAR POSTERIOR WALL IN END DIASTOLE: -0.71

## 2019-01-23 PROCEDURE — 93000 ELECTROCARDIOGRAM COMPLETE: CPT | Performed by: INTERNAL MEDICINE

## 2019-01-23 PROCEDURE — 99215 OFFICE O/P EST HI 40 MIN: CPT | Performed by: INTERNAL MEDICINE

## 2019-01-23 PROCEDURE — 93306 TTE W/DOPPLER COMPLETE: CPT | Performed by: INTERNAL MEDICINE

## 2019-01-23 NOTE — ASSESSMENT & PLAN NOTE
The patient reports intraoperative atrial fibrillation during urologic surgery 6-2018.  She does not recall having any problems/atrial fibrillation  postoperatively.  She was seen by cardiology at that time, started on low-dose beta-blocker, but was not anticoagulated.    Holter monitor here has been unremarkable for atrial fibrillation.  Her EKG shows sinus rhythm.    She does not require systemic anticoagulation at this time and especially with her ongoing issues with anemia and thrombocytopenia, no further anticoagulation is recommended.  If she requires cessation of aspirin (she takes a full dose of aspirin for body aches) there is no cardiac contraindication.    This was discussed with her at length.  All questions answered.

## 2019-01-23 NOTE — ASSESSMENT & PLAN NOTE
Continue with losartan 100 mg daily and Lasix 20 mg daily.  She is also on metoprolol 12.5 mg twice daily

## 2019-01-23 NOTE — LETTER
January 23, 2019     Johnny Gates,   [address]  [place] [address]    Patient: Siria Mendieta   YOB: 1950   Date of Visit: 1/23/2019       Dear Dr. Gates:    Thank you for referring Siria Mendieta to me for evaluation. Below are my notes for this consultation.    If you have questions, please do not hesitate to call me. I look forward to following your patient along with you.         Sincerely,        Lizette Sifuentes MD        CC: No Recipients  Lizette Sifuentes MD  1/23/2019  3:24 PM  Signed       Cardiology Outpatient Note       Siria Mendieta is a 68 y.o. female who was seen previously in 2017 for palpitations and dyspnea on exertion.  Her symptoms essentially subsided and her cardiac testing which included an echocardiogram and stress test at that time were unremarkable.    She had a number of urinary issues and  removal of vaginal mesh, vaginoplasty 6-2018 at [facility] and was told she had intraoperative atrial fibrillation.   She was told to see her cardiologist as an outpatient because she had atrial fibrillation.  She was seen by cardiologist during that admission but does not remember many of the details.  She was not placed on oral anticoagulation.  She was started on metoprolol 12.5 mg twice daily.  Subsequently 9-2018 she had to go back for another procedure. She did not have atrial fibrillation during her second procedure 9-2018.      Records are currently available for review.  Here she has had an unremarkable stress test in 2017.  She had an echocardiogram today which I personally reviewed and shows mild left atrial dilatation and normal left ventricular function.  Holter monitor done prior to this visit showed no atrial fibrillation and she was contacted with the results.    At the time of her last visit Losartan was increased to 100 mg daily and she feels better on this dose.  In the past when doing minor things around the house she sometimes had chest discomfort which has  resolved with increase losartan use and better blood pressure control.    After her last visit she is also had to see a hematologist in [place] for anemia.  During her evaluation for anemia she was also told that she had thrombocytopenia with a platelet count of 75,000, down from 115,000.  She is slated to follow-up with him.  She denies any overt bleeding.  She denies any new medications.  We reviewed the medication she is on and they have all been long-term medications.  She denies any new changes in her medical regimen.    From a urologic standpoint, she still has an indwelling catheter but says she is healing well.  She has follow-up with her urologist.                                                         [facility]     Medical History:  Past Medical History:   Diagnosis Date   • Abnormal ECG    • Diabetes (CMS/HCC) (HCC)    • Fatty liver     with cirrohsis   • Female bladder prolapse    • Fibromyalgia    • Hypertension    • Lipid disorder    • Liver fibrosis    • Sleep apnea     CPAP       Surgical History:  Past Surgical History:   Procedure Laterality Date   • BLADDER SURGERY  2018   • CATARACT EXTRACTION, BILATERAL     • HIP FRACTURE SURGERY Right    • HYSTEROSCOPY     • INCONTINENCE SURGERY  10/2014   • REPLACEMENT TOTAL KNEE Right        Social History: reports that she has never smoked. She has never used smokeless tobacco. She reports that she does not drink alcohol or use drugs.    Family History: indicated that her mother is . She indicated that her father is . She indicated that the status of her brother is unknown. She indicated that her other is alive.       Allergies:Acetaminophen; Aspirin; Keflex [cephalexin]; Levofloxacin; Meperidine hcl; Oxycodone hcl; Penicillins; Sulfa (sulfonamide antibiotics); and Vancomycin    Current Medications:  Current Outpatient Prescriptions:   •  armodafinil (NUVIGIL) 150 mg tablet, Take 150 mg by mouth daily as needed.  , Disp: , Rfl:   •   aspirin/caffeine (KAYLEEN BACK AND BODY ORAL), Take 1 tablet by mouth 2 (two) times a day as needed., Disp: , Rfl:   •  cholecalciferol, vitamin D3, (VITAMIN D3) 5,000 unit tablet, Take 1 tablet by mouth daily., Disp: , Rfl:   •  esomeprazole (NexIUM) 40 mg capsule, Take by mouth once daily., Disp: , Rfl: 3  •  ezetimibe (ZETIA) 10 mg tablet, Take 10 mg by mouth once daily., Disp: , Rfl: 2  •  furosemide (LASIX) 20 mg tablet, Take 20 mg by mouth once daily., Disp: , Rfl: 5  •  losartan (COZAAR) 100 mg tablet, Take 1 tablet (100 mg total) by mouth daily., Disp: 30 tablet, Rfl: 6  •  metFORMIN (GLUCOPHAGE) 500 mg tablet, 500 mg daily with breakfast.  , Disp: , Rfl:   •  methenamine (HIPREX) 1 gram tablet, Take 1 g by mouth every evening.  , Disp: , Rfl: 3  •  metoprolol tartrate (LOPRESSOR) 25 mg tablet, Take 12.5 mg by mouth 2 (two) times a day., Disp: , Rfl: 3  •  naproxen (NAPROSYN) 500 mg tablet, Take 1 tablet by mouth daily.  , Disp: , Rfl: 3  •  nystatin (MYCOSTATIN) 100,000 unit/gram cream, APPLY THIN FILM TO ABDOMEN TWICE DAILY, Disp: , Rfl: 3  •  oxybutynin (DITROPAN) 5 mg tablet, Take 5 mg by mouth 3 (three) times a day., Disp: , Rfl: 3  •  vitamin E 400 unit capsule, Take 1 capsule by mouth daily., Disp: , Rfl:   •  vortioxetine (TRINTELLIX) 5 mg tablet, Take 5 mg by mouth daily. Same time each day, Disp: , Rfl:                                                       OBJECTIVE   ROS   Constitution: Negative for chills and fever.   Eyes: Negative for blurred vision and visual disturbance.   Cardiovascular: Negative for chest pain, near-syncope, palpitations and syncope.   Respiratory: Negative for hemoptysis, dyspnea on exertion unchanged  Hematologic/Lymphatic: Denies melena abnormal bleeding no significant hematuria  Skin: Negative for rash. No new skin changes  Gastrointestinal: Negative for abdominal pain,  melena, nausea and vomiting.   Genitourinary: Negative for hematuria.   Neurological: Negative for  "headaches.      Vitals:    01/23/19 1431   BP: 130/85   BP Location: Left upper arm   Patient Position: Sitting   Pulse: (!) 59   Resp: 16   Weight: 106 kg (234 lb 9.6 oz)   Height: 1.6 m (5' 3\")       BP Readings from Last 3 Encounters:   01/23/19 130/85   01/23/19 (!) 154/82   10/31/18 (!) 154/82     Wt Readings from Last 3 Encounters:   01/23/19 106 kg (234 lb 9.6 oz)   01/23/19 106 kg (234 lb 9.6 oz)   10/31/18 105 kg (231 lb 9.6 oz)      Physical Exam   Constitutional:  Oriented to person, place, and time.Well-developed and well-nourished.   HEENT:  Neck Supple.  No JVD   Head: Normocephalic.   Eyes: Extraocular movements intact  Cardiovascular: Normal rate, regular rhythm and normal heart sounds.  Exam reveals no friction rub.    Pulmonary/Chest: Effort normal and breath sounds normal. No wheezes.  Abdominal: Soft and nontender. Bowel sounds are normal.   Neurological: Alert and oriented to person, place, and time.   Skin: Skin is warm and dry. no pitting edema  Psychiatric: Behavior is normal.   Objective   No results found for: WBC, HGB, PLT, CHOL, TRIG, HDL, LDLDIRECT, ALT, AST, NA, K, CREATININE, TSH, INR, HGBA1C  Troponin I Results     No lab values to display.                                                             IMAGING   Transthoracic echocardiogram 1-23-19  1.  Normal left ventricular size.  Endocardium not well seen cannot estimate wall thickness or wall motion abnormalities.  Left ventricular ejection fraction estimated at 65-70%.  Grade 1 diastolic dysfunction.  Mild left atrial dilatation.     2.  Normal right ventricular size and function.  Normal right atrial size.     3.  The aortic valve is not well seen.  Doppler examination shows no aortic stenosis or insufficiency.  Aortic root normal size.     4.  Mild posterior mitral annular calcification.  Trace mitral regurgitation.     5.  Tricuspid valve is not well seen.  Trace tricuspid regurgitation.  Jet insufficient to calculate RVSP.  " Pulmonic valve not well seen.  Normal inferior vena cava.     6.  No pericardial effusion.     No prior study for comparison.    Pharmacologic nuclear stress test 2-2017  Small area of decreased isotope uptake in the apex on resting images only consistent with artifact. Stress perfusion imaging is normal  without evidence of significant ischemia. Left ventricular function is hyperdynamic greater than 75%.         EKG   Sinus  Bradycardia 59bpm QTC 403ms    -First degree A-V block   Quentin = 226  Low voltage in precordial leads.                                            ASSESSMENT AND PLAN   Ms. Mendieta is a 68-year-old female with the following issues:        Assessment/Plan    Dyspnea on exertion  This is partly related to deconditioning.  Her presentation is not suggestive of an acute coronary syndrome or congestive heart failure.  Recommend risk factor modification.  Patient cannot tolerate statin therapy due to her liver cirrhosis.    Essential hypertension  Continue with losartan 100 mg daily and Lasix 20 mg daily.  She is also on metoprolol 12.5 mg twice daily    PAF (paroxysmal atrial fibrillation) (CMS/HCC) (HCC)  The patient reports intraoperative atrial fibrillation during urologic surgery 6-2018.  She does not recall having any problems/atrial fibrillation  postoperatively.  She was seen by cardiology at that time, started on low-dose beta-blocker, but was not anticoagulated.    Holter monitor here has been unremarkable for atrial fibrillation.  Her EKG shows sinus rhythm.    She does not require systemic anticoagulation at this time and especially with her ongoing issues with anemia and thrombocytopenia, no further anticoagulation is recommended.  If she requires cessation of aspirin (she takes a full dose of aspirin for body aches) there is no cardiac contraindication.    This was discussed with her at length.  All questions answered.               Thank you for allowing me to participate in the care of  this patient.  I hope this information is helpful.     Lizette Sifuentes MD [facility]  1/23/2019  3:23 PM

## 2019-01-23 NOTE — PROGRESS NOTES
Cardiology Outpatient Note       Siria Mendieta is a 68 y.o. female who was seen previously in 2017 for palpitations and dyspnea on exertion.  Her symptoms essentially subsided and her cardiac testing which included an echocardiogram and stress test at that time were unremarkable.    She had a number of urinary issues and  removal of vaginal mesh, vaginoplasty 6-2018 at [facility] and was told she had intraoperative atrial fibrillation.   She was told to see her cardiologist as an outpatient because she had atrial fibrillation.  She was seen by cardiologist during that admission but does not remember many of the details.  She was not placed on oral anticoagulation.  She was started on metoprolol 12.5 mg twice daily.  Subsequently 9-2018 she had to go back for another procedure. She did not have atrial fibrillation during her second procedure 9-2018.      Records are currently available for review.  Here she has had an unremarkable stress test in 2017.  She had an echocardiogram today which I personally reviewed and shows mild left atrial dilatation and normal left ventricular function.  Holter monitor done prior to this visit showed no atrial fibrillation and she was contacted with the results.    At the time of her last visit Losartan was increased to 100 mg daily and she feels better on this dose.  In the past when doing minor things around the house she sometimes had chest discomfort which has resolved with increase losartan use and better blood pressure control.    After her last visit she is also had to see a hematologist in [place] for anemia.  During her evaluation for anemia she was also told that she had thrombocytopenia with a platelet count of 75,000, down from 115,000.  She is slated to follow-up with him.  She denies any overt bleeding.  She denies any new medications.  We reviewed the medication she is on and they have all been long-term medications.  She denies any new changes in her medical  regimen.    From a urologic standpoint, she still has an indwelling catheter but says she is healing well.  She has follow-up with her urologist.                                                         [facility]     Medical History:  Past Medical History:   Diagnosis Date   • Abnormal ECG    • Diabetes (CMS/HCC) (HCC)    • Fatty liver     with cirrohsis   • Female bladder prolapse    • Fibromyalgia    • Hypertension    • Lipid disorder    • Liver fibrosis    • Sleep apnea     CPAP       Surgical History:  Past Surgical History:   Procedure Laterality Date   • BLADDER SURGERY  2018   • CATARACT EXTRACTION, BILATERAL     • HIP FRACTURE SURGERY Right    • HYSTEROSCOPY     • INCONTINENCE SURGERY  10/2014   • REPLACEMENT TOTAL KNEE Right        Social History: reports that she has never smoked. She has never used smokeless tobacco. She reports that she does not drink alcohol or use drugs.    Family History: indicated that her mother is . She indicated that her father is . She indicated that the status of her brother is unknown. She indicated that her other is alive.       Allergies:Acetaminophen; Aspirin; Keflex [cephalexin]; Levofloxacin; Meperidine hcl; Oxycodone hcl; Penicillins; Sulfa (sulfonamide antibiotics); and Vancomycin    Current Medications:  Current Outpatient Prescriptions:   •  armodafinil (NUVIGIL) 150 mg tablet, Take 150 mg by mouth daily as needed.  , Disp: , Rfl:   •  aspirin/caffeine (KAYLEEN BACK AND BODY ORAL), Take 1 tablet by mouth 2 (two) times a day as needed., Disp: , Rfl:   •  cholecalciferol, vitamin D3, (VITAMIN D3) 5,000 unit tablet, Take 1 tablet by mouth daily., Disp: , Rfl:   •  esomeprazole (NexIUM) 40 mg capsule, Take by mouth once daily., Disp: , Rfl: 3  •  ezetimibe (ZETIA) 10 mg tablet, Take 10 mg by mouth once daily., Disp: , Rfl: 2  •  furosemide (LASIX) 20 mg tablet, Take 20 mg by mouth once daily., Disp: , Rfl: 5  •  losartan (COZAAR) 100 mg tablet, Take 1 tablet  "(100 mg total) by mouth daily., Disp: 30 tablet, Rfl: 6  •  metFORMIN (GLUCOPHAGE) 500 mg tablet, 500 mg daily with breakfast.  , Disp: , Rfl:   •  methenamine (HIPREX) 1 gram tablet, Take 1 g by mouth every evening.  , Disp: , Rfl: 3  •  metoprolol tartrate (LOPRESSOR) 25 mg tablet, Take 12.5 mg by mouth 2 (two) times a day., Disp: , Rfl: 3  •  naproxen (NAPROSYN) 500 mg tablet, Take 1 tablet by mouth daily.  , Disp: , Rfl: 3  •  nystatin (MYCOSTATIN) 100,000 unit/gram cream, APPLY THIN FILM TO ABDOMEN TWICE DAILY, Disp: , Rfl: 3  •  oxybutynin (DITROPAN) 5 mg tablet, Take 5 mg by mouth 3 (three) times a day., Disp: , Rfl: 3  •  vitamin E 400 unit capsule, Take 1 capsule by mouth daily., Disp: , Rfl:   •  vortioxetine (TRINTELLIX) 5 mg tablet, Take 5 mg by mouth daily. Same time each day, Disp: , Rfl:                                                       OBJECTIVE   ROS   Constitution: Negative for chills and fever.   Eyes: Negative for blurred vision and visual disturbance.   Cardiovascular: Negative for chest pain, near-syncope, palpitations and syncope.   Respiratory: Negative for hemoptysis, dyspnea on exertion unchanged  Hematologic/Lymphatic: Denies melena abnormal bleeding no significant hematuria  Skin: Negative for rash. No new skin changes  Gastrointestinal: Negative for abdominal pain,  melena, nausea and vomiting.   Genitourinary: Negative for hematuria.   Neurological: Negative for headaches.      Vitals:    01/23/19 1431   BP: 130/85   BP Location: Left upper arm   Patient Position: Sitting   Pulse: (!) 59   Resp: 16   Weight: 106 kg (234 lb 9.6 oz)   Height: 1.6 m (5' 3\")       BP Readings from Last 3 Encounters:   01/23/19 130/85   01/23/19 (!) 154/82   10/31/18 (!) 154/82     Wt Readings from Last 3 Encounters:   01/23/19 106 kg (234 lb 9.6 oz)   01/23/19 106 kg (234 lb 9.6 oz)   10/31/18 105 kg (231 lb 9.6 oz)      Physical Exam   Constitutional:  Oriented to person, place, and time.Well-developed " and well-nourished.   HEENT:  Neck Supple.  No JVD   Head: Normocephalic.   Eyes: Extraocular movements intact  Cardiovascular: Normal rate, regular rhythm and normal heart sounds.  Exam reveals no friction rub.    Pulmonary/Chest: Effort normal and breath sounds normal. No wheezes.  Abdominal: Soft and nontender. Bowel sounds are normal.   Neurological: Alert and oriented to person, place, and time.   Skin: Skin is warm and dry. no pitting edema  Psychiatric: Behavior is normal.   Objective   No results found for: WBC, HGB, PLT, CHOL, TRIG, HDL, LDLDIRECT, ALT, AST, NA, K, CREATININE, TSH, INR, HGBA1C  Troponin I Results     No lab values to display.                                                             IMAGING   Transthoracic echocardiogram 1-23-19  1.  Normal left ventricular size.  Endocardium not well seen cannot estimate wall thickness or wall motion abnormalities.  Left ventricular ejection fraction estimated at 65-70%.  Grade 1 diastolic dysfunction.  Mild left atrial dilatation.     2.  Normal right ventricular size and function.  Normal right atrial size.     3.  The aortic valve is not well seen.  Doppler examination shows no aortic stenosis or insufficiency.  Aortic root normal size.     4.  Mild posterior mitral annular calcification.  Trace mitral regurgitation.     5.  Tricuspid valve is not well seen.  Trace tricuspid regurgitation.  Jet insufficient to calculate RVSP.  Pulmonic valve not well seen.  Normal inferior vena cava.     6.  No pericardial effusion.     No prior study for comparison.    Pharmacologic nuclear stress test 2-2017  Small area of decreased isotope uptake in the apex on resting images only consistent with artifact. Stress perfusion imaging is normal  without evidence of significant ischemia. Left ventricular function is hyperdynamic greater than 75%.         EKG   Sinus  Bradycardia 59bpm QTC 403ms    -First degree A-V block   Quentin = 226  Low voltage in precordial leads.                                             ASSESSMENT AND PLAN   Ms. Mendieta is a 68-year-old female with the following issues:        Assessment/Plan    Dyspnea on exertion  This is partly related to deconditioning.  Her presentation is not suggestive of an acute coronary syndrome or congestive heart failure.  Recommend risk factor modification.  Patient cannot tolerate statin therapy due to her liver cirrhosis.    Essential hypertension  Continue with losartan 100 mg daily and Lasix 20 mg daily.  She is also on metoprolol 12.5 mg twice daily    PAF (paroxysmal atrial fibrillation) (CMS/HCC) (HCC)  The patient reports intraoperative atrial fibrillation during urologic surgery 6-2018.  She does not recall having any problems/atrial fibrillation  postoperatively.  She was seen by cardiology at that time, started on low-dose beta-blocker, but was not anticoagulated.    Holter monitor here has been unremarkable for atrial fibrillation.  Her EKG shows sinus rhythm.    She does not require systemic anticoagulation at this time and especially with her ongoing issues with anemia and thrombocytopenia, no further anticoagulation is recommended.  If she requires cessation of aspirin (she takes a full dose of aspirin for body aches) there is no cardiac contraindication.    This was discussed with her at length.  All questions answered.               Thank you for allowing me to participate in the care of this patient.  I hope this information is helpful.     Lizette Sifuentes MD [facility]  1/23/2019  3:23 PM

## 2019-06-04 DIAGNOSIS — I10 ESSENTIAL HYPERTENSION: ICD-10-CM

## 2019-06-04 RX ORDER — LOSARTAN POTASSIUM 100 MG/1
100 TABLET ORAL DAILY
Qty: 30 TABLET | Refills: 5 | Status: SHIPPED | OUTPATIENT
Start: 2019-06-04 | End: 2023-10-25

## 2020-01-24 ENCOUNTER — OFFICE VISIT (OUTPATIENT)
Dept: CARDIOLOGY | Facility: CLINIC | Age: 70
End: 2020-01-24
Payer: MEDICARE

## 2020-01-24 VITALS
WEIGHT: 215.4 LBS | HEART RATE: 56 BPM | DIASTOLIC BLOOD PRESSURE: 70 MMHG | BODY MASS INDEX: 36.77 KG/M2 | HEIGHT: 64 IN | SYSTOLIC BLOOD PRESSURE: 120 MMHG | RESPIRATION RATE: 16 BRPM

## 2020-01-24 DIAGNOSIS — R07.9 CHEST PAIN, UNSPECIFIED TYPE: ICD-10-CM

## 2020-01-24 DIAGNOSIS — I10 ESSENTIAL HYPERTENSION: Primary | ICD-10-CM

## 2020-01-24 PROCEDURE — 99215 OFFICE O/P EST HI 40 MIN: CPT | Performed by: INTERNAL MEDICINE

## 2020-01-24 PROCEDURE — 93000 ELECTROCARDIOGRAM COMPLETE: CPT | Performed by: INTERNAL MEDICINE

## 2020-01-24 RX ORDER — OMEPRAZOLE 40 MG/1
1 CAPSULE, DELAYED RELEASE ORAL DAILY
COMMUNITY
Start: 2020-01-16

## 2020-01-24 RX ORDER — ATENOLOL 25 MG/1
25 TABLET ORAL DAILY
Qty: 90 TABLET | Refills: 5 | Status: SHIPPED | OUTPATIENT
Start: 2020-01-24 | End: 2021-04-07

## 2020-01-24 RX ORDER — CIPROFLOXACIN 500 MG/1
1 TABLET ORAL 2 TIMES DAILY
COMMUNITY
Start: 2020-01-21 | End: 2020-01-31

## 2020-01-24 NOTE — LETTER
January 24, 2020     LUIS Townsend  [address] [place] [id number]    Patient: Siria Mendieta  YOB: 1950  Date of Visit: 1/24/2020      Dear Dr. Chaudhary:    Thank you for referring Siria Mendieta to me for evaluation. Below are my notes for this consultation.    If you have questions, please do not hesitate to call me. I look forward to following your patient along with you.         Sincerely,        Lizette Sifuentes MD        CC: No Recipients  Lizette Sifuentes MD  1/24/2020  2:54 PM  Signed       Cardiology Outpatient Note    [facility]  [address] [facility] [place] [address]     [facility]  The Heart [facility] Level  [address] [place] [address]     TEL  [phone]  [facility]     Siria Mendieta is a 69 y.o. female who comes in for routine annual follow-up.  She has a number of complicated urogynecologic issues and previously had surgery with Dr. Maria E Ness at [facility] which has now closed.  She had removal of vaginal mesh and a vaginoplasty 6-2018 and at that time had intraoperative atrial fibrillation.She was told to see her cardiologist as an outpatient because she had atrial fibrillation.  She was seen by cardiologist during that admission but does not remember many of the details.  She was not placed on oral anticoagulation.  She was started on metoprolol 12.5 mg twice daily.  Subsequently 9-2018 she had to go back for another procedure. She did not have atrial fibrillation during her second procedure 9-2018.      She has not been in atrial fibrillation since that time.  She had a number of issues with anemia which appear to have resolved with removal of her vaginal mesh.  She is still thrombocytopenic with a platelet count of 90,000.    She reports having some substernal discomfort particularly when she swallows pills.  She has a large hiatal hernia.  The pain may last up to 15  minutes and resolves with drinking water and occasional belching.  She does not have associated diaphoresis radiation to the arm or jaw, shortness of breath, or palpitations.  Stress testing in 2017 was unremarkable.    She has had no further palpitations while on beta-blocker.  She recently had sinus surgery.  She is planning on seeing Dr. Ness again if she can reconnect to see if there is a final chance for another urogynecologic surgery as she still appears to have a fissure.  She has an indwelling suprapubic catheter which is changed monthly by visiting nurse.  She is hoping that with surgery if it is successful she would not need the suprapubic catheter.                                                         [facility]     Medical History:  Past Medical History:   Diagnosis Date   • Abnormal ECG    • Diabetes (CMS/HCC)    • Fatty liver     with cirrohsis   • Female bladder prolapse    • Fibromyalgia    • Hypertension    • Lipid disorder    • Liver fibrosis    • Sleep apnea     CPAP       Surgical History:  Past Surgical History:   Procedure Laterality Date   • BLADDER SURGERY  2018   • CATARACT EXTRACTION, BILATERAL     • HIP FRACTURE SURGERY Right    • HYSTEROSCOPY     • INCONTINENCE SURGERY  10/2014   • REPLACEMENT TOTAL KNEE Right        Social History: reports that she has never smoked. She has never used smokeless tobacco. She reports that she does not drink alcohol or use drugs.    Family History: She indicated that her biological mother is . She indicated that her biological father is . She indicated that the status of her biological brother is unknown. She indicated that her other is alive.      Allergies:Acetaminophen; Aspirin; Keflex [cephalexin]; Levofloxacin; Meperidine hcl; Oxycodone hcl; Penicillins; Sulfa (sulfonamide antibiotics); and Vancomycin    Current Medications:    Outpatient Encounter Medications as of 2020:   •  armodafinil (NUVIGIL) 150 mg tablet, Take 150 mg  by mouth daily as needed.    •  cholecalciferol, vitamin D3, (VITAMIN D3) 5,000 unit tablet, Take 1 tablet by mouth daily.  •  ezetimibe (ZETIA) 10 mg tablet, Take 10 mg by mouth once daily.  •  furosemide (LASIX) 20 mg tablet, Take 20 mg by mouth once daily.  •  losartan (COZAAR) 100 mg tablet, Take 1 tablet (100 mg total) by mouth daily.  •  methenamine (HIPREX) 1 gram tablet, Take 1 g by mouth every evening.    •  naproxen (NAPROSYN) 500 mg tablet, Take 1 tablet by mouth daily.    •  oxybutynin (DITROPAN) 5 mg tablet, Take 5 mg by mouth 3 (three) times a day.  •  vitamin E 400 unit capsule, Take 1 capsule by mouth daily.  •  vortioxetine (TRINTELLIX) 5 mg tablet, Take 5 mg by mouth daily. Same time each day  •  [DISCONTINUED] metoprolol tartrate (LOPRESSOR) 25 mg tablet, Take 12.5 mg by mouth 2 (two) times a day.  •  aspirin/caffeine (KAYLEEN BACK AND BODY ORAL), Take 1 tablet by mouth 2 (two) times a day as needed.  •  atenolol (TENORMIN) 25 mg tablet, Take 1 tablet (25 mg total) by mouth daily.  •  ciprofloxacin (CIPRO) 500 mg tablet, Take 1 tablet by mouth 2 (two) times a day.  •  esomeprazole (NexIUM) 40 mg capsule, Take by mouth once daily.  •  metFORMIN (GLUCOPHAGE) 500 mg tablet, 500 mg daily with breakfast.    •  nystatin (MYCOSTATIN) 100,000 unit/gram cream, APPLY THIN FILM TO ABDOMEN TWICE DAILY  •  omeprazole (PriLOSEC) 40 mg capsule, Take 1 capsule by mouth daily.                                                          OBJECTIVE   ROS as in HPI   Constitution: Negative for chills and fever.   Eyes: Negative for blurred vision and visual disturbance.   Cardiovascular: Negative for anginal chest pain, dyspnea on exertion, near-syncope, palpitations and syncope.   Respiratory: Negative for hemoptysis, shortness of breath and wheezing.    Hematologic/Lymphatic: Negative for bleeding problem.   Skin: Negative for rash. No new skin changes  Gastrointestinal: Negative for abdominal pain, diarrhea,  "hematochezia, melena, nausea and vomiting.  Positive hiatal hernia  Genitourinary: Negative for dysuria and hematuria.  Suprapubic catheter  Neurological: Negative for headaches.    Vitals:    01/24/20 1405   BP: 120/70   BP Location: Left upper arm   Patient Position: Sitting   Pulse: (!) 56   Resp: 16   Weight: 97.7 kg (215 lb 6.4 oz)   Height: 1.626 m (5' 4\")       BP Readings from Last 3 Encounters:   01/24/20 120/70   01/23/19 (!) 154/82   01/23/19 130/85     Wt Readings from Last 3 Encounters:   01/24/20 97.7 kg (215 lb 6.4 oz)   01/23/19 106 kg (234 lb 9.6 oz)   01/23/19 106 kg (234 lb 9.6 oz)        Physical Exam   Constitutional: Appears comfortable in no distress  HEENT:  Neck Supple.  No JVD No carotid bruits  Head: Normocephalic.   Eyes: Extraocular movements intact  Cardiovascular: Normal rate, regular rhythm 1 out of 6 systolic murmur left sternal border exam reveals no friction rub.    Pulmonary/Chest: Effort normal and breath sounds normal. No wheezes.  Abdominal: Soft and nontender.   Musculoskeletal: No lower extremity edema.   Neurological: Alert and oriented to person, place, and time.   Skin: Skin is warm and dry.   Psychiatric: Behavior is normal.   Objective   No results found for: CHOL  No results found for: HDL  No results found for: LDLCALC  No results found for: TRIG  No results found for: ALT, AST  No results found for: WBC, HGB, HCT, PLT, INR  No results found for: GLUCOSE, NA, K, CO2, CL, BUN, CREATININE  No results found for: HGBA1C  No results found for: TSH  No results found for: BNP  [unfilled]    Troponin I Results     No lab values to display.                                                             IMAGING     Transthoracic echocardiogram 1-23-19  1.  Normal left ventricular size.  Endocardium not well seen cannot estimate wall thickness or wall motion abnormalities.  Left ventricular ejection fraction estimated at 65-70%.  Grade 1 diastolic dysfunction.  Mild left atrial " dilatation.     2.  Normal right ventricular size and function.  Normal right atrial size.     3.  The aortic valve is not well seen.  Doppler examination shows no aortic stenosis or insufficiency.  Aortic root normal size.     4.  Mild posterior mitral annular calcification.  Trace mitral regurgitation.     5.  Tricuspid valve is not well seen.  Trace tricuspid regurgitation.  Jet insufficient to calculate RVSP.  Pulmonic valve not well seen.  Normal inferior vena cava.     6.  No pericardial effusion.     No prior study for comparison.     Pharmacologic nuclear stress test 2-2017  Small area of decreased isotope uptake in the apex on resting images only consistent with artifact. Stress perfusion imaging is normal  without evidence of significant ischemia. Left ventricular function is hyperdynamic greater than 75%.       EKG 01/24/20  Sinus  Bradycardia  56bpm QTC 401ms  -First degree A-V block Quentin = 230                                           ASSESSMENT AND PLAN   Ms. Mendieta is a 68-year-old woman with suprapubic catheter and multiple urogynecologic issues.  Cardiac issues include:        Assessment/Plan    Chest pain  The patient has chest discomfort most noticeable when she swallows pills.  It may be related to her hiatal hernia.  She has no significant EKG changes.  Has no other associated symptoms and has an unremarkable stress test from 2017 without significant ischemia.  She will continue with her current medications including a beta-blocker.  That medication has been considered in the past but patient cannot tolerate it due to her liver issues/cirrhosis.    Essential hypertension  Lasix 20 mg daily losartan 100 mg daily atenolol 25 mg daily-this was changed to a daily dose because she could not cut the tablets appropriately    PAF (paroxysmal atrial fibrillation) (CMS/HCC)  The patient reports intraoperative atrial fibrillation during urologic surgery 6-2018.  She does not recall having any  problems/atrial fibrillation  postoperatively.  She was seen by cardiology at that time, started on low-dose beta-blocker, but was not anticoagulated.    Holter monitor here has been unremarkable for atrial fibrillation.  Her EKG shows sinus rhythm.    She does not require systemic anticoagulation at this time and especially with her ongoing issues with  thrombocytopenia, no further anticoagulation is recommended.  If she requires cessation of aspirin (she takes a full dose of aspirin for body aches) there is no cardiac contraindication.    This was discussed with her at length.  All questions answered.    She was offered follow-up in 6 months but wishes to return on an annual basis because the office is far for her.          Return in about 1 year (around 1/24/2021).   Thank you for allowing me to participate in the care of this patient.  I hope this information is helpful.         Lizette Sifuentes MD [facility]  1/24/2020  2:53 PM    This document was generated utilizing voice recognition technology. A reasonable attempt at proofreading has been made to minimize errors but please excuse any typographical errors which may be present. Please call with any questions.

## 2020-01-24 NOTE — ASSESSMENT & PLAN NOTE
The patient has chest discomfort most noticeable when she swallows pills.  It may be related to her hiatal hernia.  She has no significant EKG changes.  Has no other associated symptoms and has an unremarkable stress test from 2017 without significant ischemia.  She will continue with her current medications including a beta-blocker.  That medication has been considered in the past but patient cannot tolerate it due to her liver issues/cirrhosis.

## 2020-01-24 NOTE — PROGRESS NOTES
Cardiology Outpatient Note    [facility]    [address] [place] [address]  [address] [place] [address]     [facility]  The Heart [address]  [address] [place] [address]     TEL  [phone]  [facility]hc     Siria Mendieta is a 69 y.o. female who comes in for routine annual follow-up.  She has a number of complicated urogynecologic issues and previously had surgery with Dr. Maria E Ness at [facility] which has now closed.  She had removal of vaginal mesh and a vaginoplasty 6-2018 and at that time had intraoperative atrial fibrillation.She was told to see her cardiologist as an outpatient because she had atrial fibrillation.  She was seen by cardiologist during that admission but does not remember many of the details.  She was not placed on oral anticoagulation.  She was started on metoprolol 12.5 mg twice daily.  Subsequently 9-2018 she had to go back for another procedure. She did not have atrial fibrillation during her second procedure 9-2018.      She has not been in atrial fibrillation since that time.  She had a number of issues with anemia which appear to have resolved with removal of her vaginal mesh.  She is still thrombocytopenic with a platelet count of 90,000.    She reports having some substernal discomfort particularly when she swallows pills.  She has a large hiatal hernia.  The pain may last up to 15 minutes and resolves with drinking water and occasional belching.  She does not have associated diaphoresis radiation to the arm or jaw, shortness of breath, or palpitations.  Stress testing in 2017 was unremarkable.    She has had no further palpitations while on beta-blocker.  She recently had sinus surgery.  She is planning on seeing Dr. Ness again if she can reconnect to see if there is a final chance for another urogynecologic surgery as she still appears to have a fissure.  She has an indwelling suprapubic  catheter which is changed monthly by visiting nurse.  She is hoping that with surgery if it is successful she would not need the suprapubic catheter.                                                         [facility]     Medical History:  Past Medical History:   Diagnosis Date   • Abnormal ECG    • Diabetes (CMS/HCC)    • Fatty liver     with cirrohsis   • Female bladder prolapse    • Fibromyalgia    • Hypertension    • Lipid disorder    • Liver fibrosis    • Sleep apnea     CPAP       Surgical History:  Past Surgical History:   Procedure Laterality Date   • BLADDER SURGERY  2018   • CATARACT EXTRACTION, BILATERAL     • HIP FRACTURE SURGERY Right    • HYSTEROSCOPY     • INCONTINENCE SURGERY  10/2014   • REPLACEMENT TOTAL KNEE Right        Social History: reports that she has never smoked. She has never used smokeless tobacco. She reports that she does not drink alcohol or use drugs.    Family History: She indicated that her biological mother is . She indicated that her biological father is . She indicated that the status of her biological brother is unknown. She indicated that her other is alive.      Allergies:Acetaminophen; Aspirin; Keflex [cephalexin]; Levofloxacin; Meperidine hcl; Oxycodone hcl; Penicillins; Sulfa (sulfonamide antibiotics); and Vancomycin    Current Medications:    Outpatient Encounter Medications as of 2020:   •  armodafinil (NUVIGIL) 150 mg tablet, Take 150 mg by mouth daily as needed.    •  cholecalciferol, vitamin D3, (VITAMIN D3) 5,000 unit tablet, Take 1 tablet by mouth daily.  •  ezetimibe (ZETIA) 10 mg tablet, Take 10 mg by mouth once daily.  •  furosemide (LASIX) 20 mg tablet, Take 20 mg by mouth once daily.  •  losartan (COZAAR) 100 mg tablet, Take 1 tablet (100 mg total) by mouth daily.  •  methenamine (HIPREX) 1 gram tablet, Take 1 g by mouth every evening.    •  naproxen (NAPROSYN) 500 mg tablet, Take 1 tablet by mouth daily.    •  oxybutynin (DITROPAN) 5 mg  "tablet, Take 5 mg by mouth 3 (three) times a day.  •  vitamin E 400 unit capsule, Take 1 capsule by mouth daily.  •  vortioxetine (TRINTELLIX) 5 mg tablet, Take 5 mg by mouth daily. Same time each day  •  [DISCONTINUED] metoprolol tartrate (LOPRESSOR) 25 mg tablet, Take 12.5 mg by mouth 2 (two) times a day.  •  aspirin/caffeine (KAYLEEN BACK AND BODY ORAL), Take 1 tablet by mouth 2 (two) times a day as needed.  •  atenolol (TENORMIN) 25 mg tablet, Take 1 tablet (25 mg total) by mouth daily.  •  ciprofloxacin (CIPRO) 500 mg tablet, Take 1 tablet by mouth 2 (two) times a day.  •  esomeprazole (NexIUM) 40 mg capsule, Take by mouth once daily.  •  metFORMIN (GLUCOPHAGE) 500 mg tablet, 500 mg daily with breakfast.    •  nystatin (MYCOSTATIN) 100,000 unit/gram cream, APPLY THIN FILM TO ABDOMEN TWICE DAILY  •  omeprazole (PriLOSEC) 40 mg capsule, Take 1 capsule by mouth daily.                                                          OBJECTIVE   ROS as in HPI   Constitution: Negative for chills and fever.   Eyes: Negative for blurred vision and visual disturbance.   Cardiovascular: Negative for anginal chest pain, dyspnea on exertion, near-syncope, palpitations and syncope.   Respiratory: Negative for hemoptysis, shortness of breath and wheezing.    Hematologic/Lymphatic: Negative for bleeding problem.   Skin: Negative for rash. No new skin changes  Gastrointestinal: Negative for abdominal pain, diarrhea, hematochezia, melena, nausea and vomiting.  Positive hiatal hernia  Genitourinary: Negative for dysuria and hematuria.  Suprapubic catheter  Neurological: Negative for headaches.    Vitals:    01/24/20 1405   BP: 120/70   BP Location: Left upper arm   Patient Position: Sitting   Pulse: (!) 56   Resp: 16   Weight: 97.7 kg (215 lb 6.4 oz)   Height: 1.626 m (5' 4\")       BP Readings from Last 3 Encounters:   01/24/20 120/70   01/23/19 (!) 154/82   01/23/19 130/85     Wt Readings from Last 3 Encounters:   01/24/20 97.7 kg (215 " lb 6.4 oz)   01/23/19 106 kg (234 lb 9.6 oz)   01/23/19 106 kg (234 lb 9.6 oz)        Physical Exam   Constitutional: Appears comfortable in no distress  HEENT:  Neck Supple.  No JVD No carotid bruits  Head: Normocephalic.   Eyes: Extraocular movements intact  Cardiovascular: Normal rate, regular rhythm 1 out of 6 systolic murmur left sternal border exam reveals no friction rub.    Pulmonary/Chest: Effort normal and breath sounds normal. No wheezes.  Abdominal: Soft and nontender.   Musculoskeletal: No lower extremity edema.   Neurological: Alert and oriented to person, place, and time.   Skin: Skin is warm and dry.   Psychiatric: Behavior is normal.   Objective   No results found for: CHOL  No results found for: HDL  No results found for: LDLCALC  No results found for: TRIG  No results found for: ALT, AST  No results found for: WBC, HGB, HCT, PLT, INR  No results found for: GLUCOSE, NA, K, CO2, CL, BUN, CREATININE  No results found for: HGBA1C  No results found for: TSH  No results found for: BNP  [unfilled]    Troponin I Results     No lab values to display.                                                             IMAGING     Transthoracic echocardiogram 1-23-19  1.  Normal left ventricular size.  Endocardium not well seen cannot estimate wall thickness or wall motion abnormalities.  Left ventricular ejection fraction estimated at 65-70%.  Grade 1 diastolic dysfunction.  Mild left atrial dilatation.     2.  Normal right ventricular size and function.  Normal right atrial size.     3.  The aortic valve is not well seen.  Doppler examination shows no aortic stenosis or insufficiency.  Aortic root normal size.     4.  Mild posterior mitral annular calcification.  Trace mitral regurgitation.     5.  Tricuspid valve is not well seen.  Trace tricuspid regurgitation.  Jet insufficient to calculate RVSP.  Pulmonic valve not well seen.  Normal inferior vena cava.     6.  No pericardial effusion.     No prior study for  comparison.     Pharmacologic nuclear stress test 2-2017  Small area of decreased isotope uptake in the apex on resting images only consistent with artifact. Stress perfusion imaging is normal  without evidence of significant ischemia. Left ventricular function is hyperdynamic greater than 75%.       EKG 01/24/20  Sinus  Bradycardia  56bpm QTC 401ms  -First degree A-V block Quentin = 230                                           ASSESSMENT AND PLAN   Ms. Mendieta is a 68-year-old woman with suprapubic catheter and multiple urogynecologic issues.  Cardiac issues include:        Assessment/Plan    Chest pain  The patient has chest discomfort most noticeable when she swallows pills.  It may be related to her hiatal hernia.  She has no significant EKG changes.  Has no other associated symptoms and has an unremarkable stress test from 2017 without significant ischemia.  She will continue with her current medications including a beta-blocker.  That medication has been considered in the past but patient cannot tolerate it due to her liver issues/cirrhosis.    Essential hypertension  Lasix 20 mg daily losartan 100 mg daily atenolol 25 mg daily-this was changed to a daily dose because she could not cut the tablets appropriately    PAF (paroxysmal atrial fibrillation) (CMS/HCC)  The patient reports intraoperative atrial fibrillation during urologic surgery 6-2018.  She does not recall having any problems/atrial fibrillation  postoperatively.  She was seen by cardiology at that time, started on low-dose beta-blocker, but was not anticoagulated.    Holter monitor here has been unremarkable for atrial fibrillation.  Her EKG shows sinus rhythm.    She does not require systemic anticoagulation at this time and especially with her ongoing issues with  thrombocytopenia, no further anticoagulation is recommended.  If she requires cessation of aspirin (she takes a full dose of aspirin for body aches) there is no cardiac  contraindication.    This was discussed with her at length.  All questions answered.    She was offered follow-up in 6 months but wishes to return on an annual basis because the office is far for her.          Return in about 1 year (around 1/24/2021).   Thank you for allowing me to participate in the care of this patient.  I hope this information is helpful.         Lizette Sifuentes MD [facility]  1/24/2020  2:53 PM    This document was generated utilizing voice recognition technology. A reasonable attempt at proofreading has been made to minimize errors but please excuse any typographical errors which may be present. Please call with any questions.

## 2020-01-24 NOTE — ASSESSMENT & PLAN NOTE
The patient reports intraoperative atrial fibrillation during urologic surgery 6-2018.  She does not recall having any problems/atrial fibrillation  postoperatively.  She was seen by cardiology at that time, started on low-dose beta-blocker, but was not anticoagulated.    Holter monitor here has been unremarkable for atrial fibrillation.  Her EKG shows sinus rhythm.    She does not require systemic anticoagulation at this time and especially with her ongoing issues with  thrombocytopenia, no further anticoagulation is recommended.  If she requires cessation of aspirin (she takes a full dose of aspirin for body aches) there is no cardiac contraindication.    This was discussed with her at length.  All questions answered.

## 2020-01-24 NOTE — ASSESSMENT & PLAN NOTE
Lasix 20 mg daily losartan 100 mg daily atenolol 25 mg daily-this was changed to a daily dose because she could not cut the tablets appropriately

## 2020-08-14 ENCOUNTER — TELEPHONE (OUTPATIENT)
Dept: UROLOGY | Facility: MEDICAL CENTER | Age: 70
End: 2020-08-14

## 2020-08-14 NOTE — TELEPHONE ENCOUNTER
This is a new patient and she wants to establish care  She has a supra pubic catheter and was seen in [place] at [facility]   Patient is going to mail her records and she is going to call [facility] to fax her [address]  Patient prefers to see a female  Please call her back at [phone]

## 2020-08-14 NOTE — TELEPHONE ENCOUNTER
Complaints/diagnosis new pt suprapubic catheter   Insurance: medicare and aarp   History of Cancer: no   Previous Urologist:dr ruben pink [facility]  Outside testing/where:us  what kind of test: us  Records requested/where:[facility] and pt will call have it faxed over   Preferred Location [place] and prefers a female

## 2020-08-14 NOTE — TELEPHONE ENCOUNTER
Call placed to patient, she is looking to establish care with our office  Patient has chronic spt, which is maintained by [facility]  Patient states she gets orders for changes from PCP  Advised patient that we do not have any female urologists, although we do have female APs who work closely with the MDs  Advised she should establish care with MD and then can be seen with AP  Patient agreeable to plan and scheduled to see Dr Tl Gonsales

## 2020-10-09 ENCOUNTER — OFFICE VISIT (OUTPATIENT)
Dept: UROLOGY | Facility: HOSPITAL | Age: 70
End: 2020-10-09
Payer: MEDICARE

## 2020-10-09 ENCOUNTER — TELEPHONE (OUTPATIENT)
Dept: UROLOGY | Facility: HOSPITAL | Age: 70
End: 2020-10-09

## 2020-10-09 VITALS
HEART RATE: 82 BPM | DIASTOLIC BLOOD PRESSURE: 72 MMHG | SYSTOLIC BLOOD PRESSURE: 132 MMHG | HEIGHT: 65 IN | WEIGHT: 213 LBS | TEMPERATURE: 97.5 F | BODY MASS INDEX: 35.49 KG/M2

## 2020-10-09 DIAGNOSIS — N82.0 VESICO-VAGINAL FISTULA: Primary | ICD-10-CM

## 2020-10-09 PROCEDURE — 99204 OFFICE O/P NEW MOD 45 MIN: CPT | Performed by: UROLOGY

## 2020-10-09 RX ORDER — LOSARTAN POTASSIUM 100 MG/1
100 TABLET ORAL DAILY
COMMUNITY

## 2020-10-09 RX ORDER — OMEPRAZOLE 40 MG/1
CAPSULE, DELAYED RELEASE ORAL
COMMUNITY

## 2020-10-09 RX ORDER — ESTRADIOL 0.1 MG/G
CREAM VAGINAL
COMMUNITY
Start: 2020-04-21 | End: 2020-12-17

## 2020-10-09 RX ORDER — OXYBUTYNIN CHLORIDE 5 MG/1
5 TABLET ORAL
COMMUNITY
Start: 2020-04-21 | End: 2020-10-09

## 2020-10-09 RX ORDER — FUROSEMIDE 20 MG/1
TABLET ORAL
COMMUNITY

## 2020-10-09 RX ORDER — NAPROXEN 500 MG/1
500 TABLET ORAL 2 TIMES DAILY WITH MEALS
COMMUNITY

## 2020-10-09 RX ORDER — ATENOLOL 25 MG/1
50 TABLET ORAL
COMMUNITY
Start: 2020-04-18

## 2020-10-09 RX ORDER — OXYBUTYNIN CHLORIDE 10 MG/1
10 TABLET, EXTENDED RELEASE ORAL DAILY
Qty: 90 TABLET | Refills: 3 | Status: SHIPPED | OUTPATIENT
Start: 2020-10-09 | End: 2021-04-14 | Stop reason: SDUPTHER

## 2020-10-09 RX ORDER — ESCITALOPRAM OXALATE 10 MG/1
10 TABLET ORAL DAILY
COMMUNITY

## 2020-10-12 PROBLEM — N82.0 VESICO-VAGINAL FISTULA: Status: ACTIVE | Noted: 2020-10-12

## 2021-04-07 ENCOUNTER — OFFICE VISIT (OUTPATIENT)
Dept: CARDIOLOGY | Facility: CLINIC | Age: 71
End: 2021-04-07
Payer: COMMERCIAL

## 2021-04-07 VITALS
BODY MASS INDEX: 41.86 KG/M2 | HEART RATE: 66 BPM | WEIGHT: 245.2 LBS | SYSTOLIC BLOOD PRESSURE: 154 MMHG | RESPIRATION RATE: 18 BRPM | DIASTOLIC BLOOD PRESSURE: 70 MMHG | HEIGHT: 64 IN

## 2021-04-07 DIAGNOSIS — I10 ESSENTIAL HYPERTENSION: Primary | ICD-10-CM

## 2021-04-07 PROCEDURE — 93000 ELECTROCARDIOGRAM COMPLETE: CPT | Performed by: INTERNAL MEDICINE

## 2021-04-07 PROCEDURE — 3008F BODY MASS INDEX DOCD: CPT | Performed by: INTERNAL MEDICINE

## 2021-04-07 PROCEDURE — 3077F SYST BP >= 140 MM HG: CPT | Performed by: INTERNAL MEDICINE

## 2021-04-07 PROCEDURE — 99214 OFFICE O/P EST MOD 30 MIN: CPT | Performed by: INTERNAL MEDICINE

## 2021-04-07 PROCEDURE — 3078F DIAST BP <80 MM HG: CPT | Performed by: INTERNAL MEDICINE

## 2021-04-07 RX ORDER — ESCITALOPRAM OXALATE 10 MG/1
10 TABLET ORAL
COMMUNITY
Start: 2021-03-12

## 2021-04-07 RX ORDER — ATENOLOL 50 MG/1
50 TABLET ORAL DAILY
Qty: 90 TABLET | Refills: 3 | Status: SHIPPED | OUTPATIENT
Start: 2021-04-07 | End: 2023-10-25

## 2021-04-07 NOTE — ASSESSMENT & PLAN NOTE
The patient reports intraoperative atrial fibrillation during urologic surgery 6-2018.  She does not recall having any problems/atrial fibrillation  postoperatively.  She was seen by cardiology at that time, started on low-dose beta-blocker, but was not anticoagulated.    Holter monitor here 2017 has been unremarkable for atrial fibrillation.  Her EKG shows sinus rhythm.  She has noted palpitations when she has been doing things but this is happened in the setting of weight gain and deconditioning.  However in light of her history, she has been scheduled for a 1 week monitor.    She does not require systemic anticoagulation at this time and especially with her ongoing issues with  thrombocytopenia, no further anticoagulation is recommended.     This was discussed with her at length.  All questions answered.

## 2021-04-07 NOTE — LETTER
April 7, 2021     LUIS Townsend  [address]  [place] [address]    Patient: Siria Mendieta  YOB: 1950  Date of Visit: 4/7/2021      Dear Dr. Chaudhary:    Thank you for referring Siria Mendieta to me for evaluation. Below are my notes for this consultation.    If you have questions, please do not hesitate to call me. I look forward to following your patient along with you.         Sincerely,        Lizette Sifuentes MD        CC: No Recipients  Lizette Sifuentes MD  4/7/2021  3:51 PM  Signed       Cardiology Outpatient Note    [facility]  [address] [place] [address]     [facility]  [facility]  [address] [place] [address]     TEL  [phone]  [facility].iKoa/mlhc     Siria Mendieta is a 71 y.o. female who comes in for routine annual follow-up.  She has a number of complicated urogynecologic issues and previously had surgery with Dr. Maria E Ness at [facility] which has now closed.  She had removal of vaginal mesh and a vaginoplasty 6-2018 and at that time had intraoperative atrial fibrillation.She was told to see her cardiologist as an outpatient because she had atrial fibrillation.  She was seen by cardiologist during that admission but does not remember many of the details.  She was not placed on oral anticoagulation.  She was started on metoprolol 12.5 mg twice daily.  Subsequently 9-2018 she had to go back for another procedure. She did not have atrial fibrillation during her second procedure 9-2018.      She has not been in atrial fibrillation since that time.  She had a number of issues with anemia which appear to have resolved with removal of her vaginal mesh.  She is still thrombocytopenic.  She has considered getting another surgery and has an indwelling suprapubic catheter.  She plans on speaking with her surgeon after the pandemic is done.  She is now more hesitant  about proceeding because she is got used to the indwelling catheter and has not had any issues.     In  the patient had periorbital cellulitis and was treated at [facility].  She was put on medication/IV antibiotics and improved.  About a month later she developed C. difficile.  She is now better and not having any issues in terms of diarrhea abdominal discomfort nausea or vomiting.  During that hospitalization her atenolol dose was supposed to be increased because of suboptimally managed blood pressure.  She said it was never done and she was told to follow-up with cardiology.  She has been less mobile and has gained weight during the pandemic.  She says that with less activity she feels more fatigued and occasionally has palpitations.  They only occur when she is doing things.  She denies any lightheadedness or syncope.    Patient had been on Zetia but was told by her primary care physician that she did not require any longer because her lipid profile was good.  She has seen gastroenterology in the past and was told that she has JOHNSON.                                                         [facility]     Medical History:  Past Medical History:   Diagnosis Date   • Abnormal ECG    • Diabetes (CMS/HCC)    • Fatty liver     with cirrohsis   • Female bladder prolapse    • Fibromyalgia    • Hypertension    • Lipid disorder    • Liver fibrosis    • Sleep apnea     CPAP       Surgical History:  Past Surgical History:   Procedure Laterality Date   • BLADDER SURGERY  2018   • CATARACT EXTRACTION, BILATERAL     • HIP FRACTURE SURGERY Right    • HYSTEROSCOPY     • INCONTINENCE SURGERY  10/2014   • REPLACEMENT TOTAL KNEE Right        Social History: reports that she has never smoked. She has never used smokeless tobacco. She reports that she does not drink alcohol or use drugs.    Family History: She indicated that her biological mother is . She indicated that her biological father is . She  indicated that the status of her biological brother is unknown. She indicated that her other is alive.      Allergies:Acetaminophen, Aspirin, Keflex [cephalexin], Levofloxacin, Meperidine hcl, Oxycodone hcl, Penicillins, Sulfa (sulfonamide antibiotics), and Vancomycin    Current Medications:    Outpatient Encounter Medications as of 4/7/2021:   •  armodafinil (NUVIGIL) 150 mg tablet, Take 150 mg by mouth daily as needed.    •  aspirin/caffeine (KAYLEEN BACK AND BODY ORAL), Take 1 tablet by mouth 2 (two) times a day as needed.  •  cholecalciferol, vitamin D3, (VITAMIN D3) 5,000 unit tablet, Take 1 tablet by mouth daily.  •  escitalopram (LEXAPRO) 10 mg tablet, Take 10 mg by mouth once daily.  •  furosemide (LASIX) 20 mg tablet, Take 20 mg by mouth once daily.  •  losartan (COZAAR) 100 mg tablet, Take 1 tablet (100 mg total) by mouth daily.  •  naproxen (NAPROSYN) 500 mg tablet, Take 1 tablet by mouth daily.    •  omeprazole (PriLOSEC) 40 mg capsule, Take 1 capsule by mouth daily.  •  oxybutynin XL (DITROPAN XL) 15 mg 24 hr tablet, Take 15 mg by mouth daily.    •  vitamin E 400 unit capsule, Take 1 capsule by mouth daily.  •  [DISCONTINUED] atenolol (TENORMIN) 25 mg tablet, Take 1 tablet (25 mg total) by mouth daily.  •  atenoloL (TENORMIN) 50 mg tablet, Take 1 tablet (50 mg total) by mouth daily.  •  [DISCONTINUED] esomeprazole (NexIUM) 40 mg capsule, Take by mouth once daily.  •  [DISCONTINUED] ezetimibe (ZETIA) 10 mg tablet, Take 10 mg by mouth once daily.  •  [DISCONTINUED] metFORMIN (GLUCOPHAGE) 500 mg tablet, 500 mg daily with breakfast.    •  [DISCONTINUED] methenamine (HIPREX) 1 gram tablet, Take 1 g by mouth every evening.    •  [DISCONTINUED] nystatin (MYCOSTATIN) 100,000 unit/gram cream, APPLY THIN FILM TO ABDOMEN TWICE DAILY  •  [DISCONTINUED] vortioxetine (TRINTELLIX) 5 mg tablet, Take 5 mg by mouth daily. Same time each day                                                          OBJECTIVE   ROS as in HPI  "  Constitution: Negative for chills and fever.   Eyes: Negative for blurred vision and visual disturbance.   Cardiovascular: Negative for anginal chest pain, dyspnea on exertion, near-syncope, palpitations as noted in HPI  Respiratory: Negative for hemoptysis, shortness of breath and wheezing.    Hematologic/Lymphatic: Negative for bleeding problem.   Skin: Negative for rash. No new skin changes  Gastrointestinal: Negative for abdominal pain, diarrhea, hematochezia, melena, nausea and vomiting.  Positive hiatal hernia  Genitourinary: Negative for dysuria and hematuria.  Suprapubic catheter  Neurological: Negative for headaches.    Vitals:    04/07/21 1508   BP: (!) 154/70   BP Location: Right upper arm   Patient Position: Sitting   Pulse: 66   Resp: 18   Weight: 111 kg (245 lb 3.2 oz)   Height: 1.626 m (5' 4\")       BP Readings from Last 3 Encounters:   04/07/21 (!) 154/70   01/24/20 120/70   01/23/19 (!) 154/82     Wt Readings from Last 3 Encounters:   04/07/21 111 kg (245 lb 3.2 oz)   01/24/20 97.7 kg (215 lb 6.4 oz)   01/23/19 106 kg (234 lb 9.6 oz)        Physical Exam   Constitutional: Appears comfortable in no distress  HEENT:  Neck Supple.  No JVD   Head: Normocephalic.   Eyes: Extraocular movements intact  Cardiovascular: Normal rate, regular rhythm 1 out of 6 systolic murmur left sternal border exam reveals no friction rub.    Pulmonary/Chest: Effort normal and breath sounds normal. No wheezes.  Abdominal: Soft and nontender.   Musculoskeletal: No lower extremity edema.   Neurological: Alert and oriented to person, place, and time.   Skin: Skin is warm and dry.   Psychiatric: Behavior is normal.   Objective   No results found for: CHOL  No results found for: HDL  No results found for: LDLCALC  No results found for: TRIG  No results found for: ALT, AST  No results found for: WBC, HGB, HCT, PLT, INR  No results found for: GLUCOSE, NA, K, CO2, CL, BUN, CREATININE  No results found for: HGBA1C  No results found " for: TSH  No results found for: BNP  [unfilled]    Troponin I Results     No lab values to display.                                                             IMAGING     Transthoracic echocardiogram 1-23-19  1.  Normal left ventricular size.  Endocardium not well seen cannot estimate wall thickness or wall motion abnormalities.  Left ventricular ejection fraction estimated at 65-70%.  Grade 1 diastolic dysfunction.  Mild left atrial dilatation.     2.  Normal right ventricular size and function.  Normal right atrial size.     3.  The aortic valve is not well seen.  Doppler examination shows no aortic stenosis or insufficiency.  Aortic root normal size.     4.  Mild posterior mitral annular calcification.  Trace mitral regurgitation.     5.  Tricuspid valve is not well seen.  Trace tricuspid regurgitation.  Jet insufficient to calculate RVSP.  Pulmonic valve not well seen.  Normal inferior vena cava.     6.  No pericardial effusion.     No prior study for comparison.     24-hour Holter monitor .    Heart rate 42 to 109 bpm with average heart rate 65 bpm.  No atrial fibrillation.    Pharmacologic nuclear stress test 2-2017  Small area of decreased isotope uptake in the apex on resting images only consistent with artifact. Stress perfusion imaging is normal  without evidence of significant ischemia. Left ventricular function is hyperdynamic greater than 75%.       EKG 04/07/21  Sinus  Rhythm 66bpm [id number]  WITHIN NORMAL LIMITS                                           ASSESSMENT AND PLAN   Ms. Mendieta is a 71-year-old woman with suprapubic catheter and multiple urogynecologic issues.  Cardiac issues include:        Assessment/Plan    PAF (paroxysmal atrial fibrillation) (CMS/[facility])  The patient reports intraoperative atrial fibrillation during urologic surgery 6-2018.  She does not recall having any problems/atrial fibrillation  postoperatively.  She was seen by cardiology at that time, started on low-dose  beta-blocker, but was not anticoagulated.    Holter monitor here 2017 has been unremarkable for atrial fibrillation.  Her EKG shows sinus rhythm.  She has noted palpitations when she has been doing things but this is happened in the setting of weight gain and deconditioning.  However in light of her history, she has been scheduled for a 1 week monitor.    She does not require systemic anticoagulation at this time and especially with her ongoing issues with  thrombocytopenia, no further anticoagulation is recommended.     This was discussed with her at length.  All questions answered.    Essential hypertension  Lasix 20 mg daily losartan 100 mg daily atenolol 50 mg daily-atenolol dose was increased today.    She was offered follow-up in 6 months but wishes to return on an annual basis because the office is far for her.  Patient will be contacted once her monitor results are available.  She completed the second shot of her Covid vaccination today.          Return in about 1 year (around 4/7/2022).   Thank you for allowing me to participate in the care of this patient.  I hope this information is helpful.         Lizette Sifuentes MD [facility]  4/7/2021  3:49 PM    This document was generated utilizing voice recognition technology. A reasonable attempt at proofreading has been made to minimize errors but please excuse any typographical errors which may be present. Please call with any questions.

## 2021-04-14 ENCOUNTER — OFFICE VISIT (OUTPATIENT)
Dept: UROLOGY | Facility: HOSPITAL | Age: 71
End: 2021-04-14
Payer: COMMERCIAL

## 2021-04-14 VITALS
SYSTOLIC BLOOD PRESSURE: 130 MMHG | HEART RATE: 50 BPM | BODY MASS INDEX: 41.15 KG/M2 | WEIGHT: 247 LBS | DIASTOLIC BLOOD PRESSURE: 88 MMHG | HEIGHT: 65 IN

## 2021-04-14 DIAGNOSIS — N82.0 VESICO-VAGINAL FISTULA: Primary | ICD-10-CM

## 2021-04-14 PROCEDURE — 99213 OFFICE O/P EST LOW 20 MIN: CPT | Performed by: NURSE PRACTITIONER

## 2021-04-14 RX ORDER — ESTRADIOL 0.1 MG/G
2 CREAM VAGINAL DAILY
Qty: 1 G | Refills: 3 | Status: CANCELLED | OUTPATIENT
Start: 2021-04-14

## 2021-04-14 RX ORDER — OXYBUTYNIN CHLORIDE 10 MG/1
10 TABLET, EXTENDED RELEASE ORAL DAILY
Qty: 90 TABLET | Refills: 3 | Status: SHIPPED | OUTPATIENT
Start: 2021-04-14

## 2021-04-14 RX ORDER — VITAMIN E 268 MG
400 CAPSULE ORAL DAILY
COMMUNITY

## 2021-04-14 NOTE — PROGRESS NOTES
04/14/21    Glennon Olszewski   1950   [id number]     Assessment   1 Vesico-vaginal fistula    Discussion/Plan  1 Vesico-vaginal fistula   -Continue oxybutynin and estrace cream, refills provided   -18 Fr benavidez catheter changed Q 3 weeks by [facility]   -Increase hydration with water due to sediment    -Avoid constipation   -Daily vitamin C supplement     She wishes to maintain a conservative approach to her care  Patient will follow up in 1 year  All questions answered  She will call with issues or concerns  Subjective  HPI   Glennon Olszewski is a 80 y o  female who presents for follow up of chronic catheter and history of vaginal vesico-fistula  The patient has a complicated urological history  She suffered from pelvic organ prolapse and underwent laparoscopic mesh procedure which was complicated by recurrent infections and the need to eventually remove all of the mesh  The patient states she had approximately 13 surgeries in all  She developed a vaginal fistula and currently manages her bladder with a suprapubic catheter  She has been taking numerous medications to prevent this including D mannose  Her catheter is an [address] and is changed by a home health nurse  She states that there has sometimes been difficulty with catheter exchanges  She is currently satisfied with her urination  She continues oxybutynin for bladder spasms   She has deferred bladder botox or cystoscopy in the past        Review of Systems - History obtained from chart review and the patient  General ROS: negative for - chills, fatigue or fever  Psychological ROS: negative  Ophthalmic ROS: negative  ENT ROS: negative  Allergy and Immunology ROS: negative  Hematological and Lymphatic ROS: negative  Endocrine ROS: negative  Breast ROS: negative  Respiratory ROS: no cough, shortness of breath, or wheezing  Cardiovascular ROS: no chest pain or dyspnea on exertion  Gastrointestinal ROS: no abdominal pain, change in bowel habits, or black or bloody stools  Genito-Urinary ROS: positive for - burning, sediment  Musculoskeletal ROS: negative  Neurological ROS: negative  Dermatological ROS: negative       Objective  Physical Exam  Constitutional:       General: She is not in acute distress  Appearance: Normal appearance  She is obese  She is not ill-appearing, toxic-appearing or diaphoretic  HENT:      Head: Normocephalic and atraumatic  Neck:      Musculoskeletal: Normal range of motion  Pulmonary:      Effort: Pulmonary effort is normal  No respiratory distress  Musculoskeletal: Normal range of motion  Skin:     General: Skin is warm and dry  Neurological:      General: No focal deficit present  Mental Status: She is alert and oriented to person, place, and time  Mental status is at baseline  Psychiatric:         Mood and Affect: Mood normal          Behavior: Behavior normal          Thought Content:  Thought content normal          Judgment: Judgment normal            Javad Baker

## 2021-05-03 ENCOUNTER — TELEPHONE (OUTPATIENT)
Dept: SCHEDULING | Facility: CLINIC | Age: 71
End: 2021-05-03

## 2021-05-03 DIAGNOSIS — I10 ESSENTIAL HYPERTENSION: ICD-10-CM

## 2021-05-03 NOTE — TELEPHONE ENCOUNTER
Pt states she was feeling sick while wearing her device and took if off.    Pt will like to know should she put the device back on for 7 days so the office can have an accurate reading    Pt can be reached at [phone]

## 2021-05-03 NOTE — TELEPHONE ENCOUNTER
I returned pt's phone call and left a message letting her know insurance may not pay for repeat study again. I did download report for Dr Sifuentes to review if anything we will let her know on Wed.

## 2021-05-05 ENCOUNTER — TELEPHONE (OUTPATIENT)
Dept: CARDIOLOGY | Facility: CLINIC | Age: 71
End: 2021-05-05

## 2021-05-05 NOTE — TELEPHONE ENCOUNTER
Please call patient and let her know that her monitor looks good.  No atrial fibrillation was noted.  She does not need further monitoring.  No change in plans and she will follow up in 1 year.  If she has further palpitations she can come back sooner. Thx        Monitor for-2021 7 days 4 hours sinus rhythm 44 to 108 bpm  With average 61 bpm.  No atrial fibrillation.  1 episode atrial tachycardia 20 beats at 106 bpm noted.

## 2022-03-22 ENCOUNTER — TELEPHONE (OUTPATIENT)
Dept: SCHEDULING | Facility: CLINIC | Age: 72
End: 2022-03-22
Payer: COMMERCIAL

## 2022-03-22 NOTE — TELEPHONE ENCOUNTER
Pt signed up for [employer] testing. A script isn't needed but there are forms that are needed to be filled out.  Pts PCP wants pt to check with Dr lan maldonado see what she thinks about the testing. If Dr Sifuentes is in agreement with pt getting testing, pt can get the paperwork, that needs to be filled out, to Dr Sifuentes.   Pt states it is DNA testing and covers cardiology.  Pt can be reached at [phone].

## 2022-03-23 NOTE — TELEPHONE ENCOUNTER
I called and spoke with Keyona.  She already spoke with her primary care provider.  This into it is a private company and there is not much information available.  I asked Keyona what the explained to her exactly what type of cardiac conditions they tested for.  She said they did not give her a lot of information.  By virtue of her age alone and history of hypertension she is at risk for cardiovascular issues but in my opinion I cannot say that genetic testing at this point will add anything of value to her care.  Also the type of genetic testing that this company does is completely unclear based on the information available.  She says that her primary care provider told her it was a private company.  Keyona is not planning on having any testing with them.  She has follow-up in our office in a few months for routine appointment.  She is currently feeling well and asymptomatic.

## 2022-04-06 ENCOUNTER — TELEPHONE (OUTPATIENT)
Dept: SCHEDULING | Facility: CLINIC | Age: 72
End: 2022-04-06
Payer: COMMERCIAL

## 2022-06-22 ENCOUNTER — OFFICE VISIT (OUTPATIENT)
Dept: CARDIOLOGY | Facility: CLINIC | Age: 72
End: 2022-06-22
Payer: COMMERCIAL

## 2022-06-22 VITALS
WEIGHT: 248.6 LBS | DIASTOLIC BLOOD PRESSURE: 80 MMHG | SYSTOLIC BLOOD PRESSURE: 140 MMHG | HEART RATE: 63 BPM | HEIGHT: 64 IN | RESPIRATION RATE: 18 BRPM | BODY MASS INDEX: 42.44 KG/M2

## 2022-06-22 DIAGNOSIS — I48.0 PAF (PAROXYSMAL ATRIAL FIBRILLATION) (CMS/HCC): Primary | ICD-10-CM

## 2022-06-22 PROCEDURE — 99215 OFFICE O/P EST HI 40 MIN: CPT | Performed by: INTERNAL MEDICINE

## 2022-06-22 PROCEDURE — 3079F DIAST BP 80-89 MM HG: CPT | Performed by: INTERNAL MEDICINE

## 2022-06-22 PROCEDURE — 93000 ELECTROCARDIOGRAM COMPLETE: CPT | Performed by: INTERNAL MEDICINE

## 2022-06-22 PROCEDURE — 3008F BODY MASS INDEX DOCD: CPT | Performed by: INTERNAL MEDICINE

## 2022-06-22 PROCEDURE — 3077F SYST BP >= 140 MM HG: CPT | Performed by: INTERNAL MEDICINE

## 2022-06-22 RX ORDER — AMLODIPINE BESYLATE 5 MG/1
5 TABLET ORAL DAILY
Qty: 90 TABLET | Refills: 1
Start: 2022-06-22 | End: 2022-12-19

## 2022-06-22 RX ORDER — GABAPENTIN 100 MG/1
100 CAPSULE ORAL 3 TIMES DAILY PRN
Start: 2022-06-22 | End: 2022-12-19

## 2022-06-22 NOTE — ASSESSMENT & PLAN NOTE
Currently no evidence of significant congestive heart failure.  Shortness of breath is multifactorial related also to weight and deconditioning.  She notes upper congestion with occasional productive cough but has a clear lung exam.  She plans on following up with pulmonary

## 2022-06-22 NOTE — ASSESSMENT & PLAN NOTE
The patient reports intraoperative atrial fibrillation during urologic surgery 6-2018.  She does not recall having any problems/atrial fibrillation  postoperatively.  She was seen by cardiology at that time, started on low-dose beta-blocker, but was not anticoagulated.    Holter monitor here 2017 has been unremarkable for atrial fibrillation.  Her EKG shows sinus rhythm.  She has noted palpitations when she has been doing things but this is happened in the setting of weight gain and deconditioning.  Monitor for-2021 unremarkable for atrial fibrillation.    She does not require systemic anticoagulation at this time and especially with her ongoing issues with  thrombocytopenia, no further anticoagulation is recommended.  Risk of anticoagulation is much greater than benefit at this time

## 2022-06-22 NOTE — LETTER
June 22, 2022     [place] Townsend  [address]  [place] [id number]    Patient: Siria Mendieta  YOB: 1950  Date of Visit: 6/22/2022      Dear Dr. Chaudhary:    Thank you for referring Siria Mendieta to me for evaluation. Below are my notes for this consultation.    If you have questions, please do not hesitate to call me. I look forward to following your patient along with you.         Sincerely,        Lizette Sifunetes MD        CC: No Recipients  Lizette Sifuentes MD  6/22/2022  6:02 PM  Signed       Cardiology Outpatient Note    Count includes the [facility] Office  [address]   [place] [id number]     [facility]  [facility] [address]  [address] [place] [address]     TEL  [phone]  [facility]/mlhc     Siria Mendieta is a 72 y.o. female who comes in for routine annual follow-up.  She has a number of complicated urogynecologic issues and previously had surgery with Dr. Maria E eNss at [facility] which has now closed.  She had removal of vaginal mesh and a vaginoplasty 6-2018 and at that time had intraoperative atrial fibrillation.She was told to see her cardiologist as an outpatient because she had atrial fibrillation.  She was seen by cardiologist during that admission but does not remember many of the details.  She was not placed on oral anticoagulation.  She was started on metoprolol 12.5 mg twice daily.  Subsequently 9-2018 she had to go back for another procedure. She did not have atrial fibrillation during her second procedure 9-2018.      She has not been in atrial fibrillation since that time.  She had a number of issues with anemia which appear to have resolved with removal of her vaginal mesh.  She is still thrombocytopenic.    She is now more hesitant about proceeding because she is got used to the indwelling catheter and has not had any issues.  She has not required a transfusion of packed red blood  cells since September.  She has:    -History of vesicovaginal fistula with suprapubic catheter which is changed every 3 weeks.  Patient says sometimes this gets clogged but she has not had any recent infections    -History of C. 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  Last visit 5-2022 and patient was deemed stable      Patient had been on Zetia but was told by her primary care physician that she did not require any longer because her lipid profile was good.     Overall her shortness of breath is improved compared to the past.  She says that she has an upper chest mucousy/congestion type feeling.  She is tried allergy medications without significant relief.  She does not have orthopnea or lower extremity edema.  No chest pain.  At times has productive cough.  No fevers.                                                           PMH     Medical History:  Past Medical History:   Diagnosis Date   • Abnormal ECG    • Diabetes (CMS/HCC)    • Fatty liver     with cirrohsis   • Female bladder prolapse    • Fibromyalgia    • Hypertension    • Lipid disorder    • Liver fibrosis    • Sleep apnea     CPAP       Surgical History:  Past Surgical History:   Procedure Laterality Date   • BLADDER SURGERY  06/25/2018   • CATARACT EXTRACTION, BILATERAL     • HIP FRACTURE SURGERY Right    • HYSTEROSCOPY     • INCONTINENCE SURGERY  10/2014   • REPLACEMENT TOTAL KNEE Right        Social History:  Social History     Tobacco Use   • Smoking status: Never Smoker   • Smokeless tobacco: Never Used   Vaping Use   • Vaping Use: Never used   Substance Use Topics   • Alcohol use: No   • Drug use: No       Family History:  She indicated that her biological mother is . She indicated that her biological father is . She indicated that the status of her biological brother is unknown. She indicated that her other is alive.      Allergies:Sulfa (sulfonamide antibiotics), Acetaminophen, Aspirin, Keflex [cephalexin], Levofloxacin, Meperidine hcl, Oxycodone hcl, Penicillins, and Vancomycin    Current Medications:    Outpatient Encounter Medications as of 2022:   •  amLODIPine (NORVASC) 5 mg tablet, Take 1 tablet (5 mg total) by mouth daily.  •  armodafiniL (NUVIGIL) 150 mg tablet, Take 150 mg by mouth daily as needed.    •  aspirin/caffeine (KAYLEEN BACK AND BODY ORAL), Take 1 tablet by mouth 2 (two) times a day as needed.  •  atenoloL (TENORMIN) 50 mg tablet, Take 1 tablet (50 mg total) by mouth daily.  •  cholecalciferol, vitamin D3, 5,000 unit (125 mcg) tablet, Take 1 tablet by mouth daily.  •  escitalopram (LEXAPRO) 10 mg tablet, Take 10 mg by mouth once daily.  •  furosemide (LASIX) 20 mg tablet, Take 20 mg by mouth once daily.  •  gabapentin (NEURONTIN) 100 mg capsule, Take 1 capsule (100 mg total) by mouth 3 (three) times a day as needed (back pain).  •  losartan (COZAAR) 100 mg tablet, Take 1 tablet (100 mg total) by mouth daily.  •  naproxen (NAPROSYN) 500 mg tablet, Take 1 tablet by mouth daily.    •  omeprazole (PriLOSEC) 40 mg capsule, Take 1 capsule by mouth daily.  •  oxybutynin XL (DITROPAN XL) 15 mg 24 hr tablet, Take 15 mg by mouth daily.    •  vitamin E 400 unit capsule, Take 1 capsule by mouth daily.                                                          OBJECTIVE   ROS as in HPI   Constitution: Negative for chills and fever.   Eyes: Negative for blurred vision and visual disturbance.   Cardiovascular: Negative for anginal chest pain, dyspnea on exertion, near-syncope,   Respiratory: Negative for hemoptysis, shortness of breath and wheezing.  Upper chest congestion with occasional productive  "cough  Hematologic/Lymphatic: Negative for bleeding problem.   Skin: Negative for rash. No new skin changes  Gastrointestinal: Negative for abdominal pain, diarrhea, hematochezia, melena, nausea and vomiting.  Positive hiatal hernia  Genitourinary: Negative for dysuria and hematuria.  Suprapubic catheter  Neurological: Negative for headaches.    Vitals:    06/22/22 1538   BP: 140/80   BP Location: Left upper arm   Patient Position: Sitting   Pulse: 63   Resp: 18   Weight: 113 kg (248 lb 9.6 oz)   Height: 1.626 m (5' 4\")       BP Readings from Last 3 Encounters:   06/22/22 140/80   04/07/21 (!) 154/70   01/24/20 120/70     Wt Readings from Last 3 Encounters:   06/22/22 113 kg (248 lb 9.6 oz)   04/07/21 111 kg (245 lb 3.2 oz)   01/24/20 97.7 kg (215 lb 6.4 oz)        Physical Exam   Constitutional: Appears comfortable in no distress  HEENT:  Neck Supple.  No JVD   Head: Normocephalic.   Eyes: Extraocular movements intact  Cardiovascular: Normal rate, regular rhythm 1 out of 6 systolic murmur left sternal border exam reveals no friction rub.    Pulmonary/Chest: Effort normal and breath sounds normal. No wheezes.  Abdominal: Soft and nontender.   Musculoskeletal: No lower extremity edema.   Neurological: Alert and oriented to person, place, and time.   Skin: Skin is warm and dry.   Psychiatric: Behavior is normal.   Objective   No results found for: CHOL  No results found for: HDL  No results found for: LDLCALC  No results found for: TRIG  No results found for: ALT, AST  No results found for: WBC, HGB, HCT, PLT, INR  No results found for: GLUCOSE, NA, K, CO2, CL, BUN, CREATININE  No results found for: HGBA1C  No results found for: TSH  No results found for: BNP  [unfilled]    Troponin I Results     No lab values to display.                                                             IMAGING   Monitor 4-2021 7 days 4 hours sinus rhythm 44 to 108 bpm  With average 61 bpm.  No atrial fibrillation.  1 episode atrial " tachycardia 20 beats at 106 bpm noted.     Transthoracic echocardiogram 1-23-19  1.  Normal left ventricular size.  Endocardium not well seen cannot estimate wall thickness or wall motion abnormalities.  Left ventricular ejection fraction estimated at 65-70%.  Grade 1 diastolic dysfunction.  Mild left atrial dilatation.     2.  Normal right ventricular size and function.  Normal right atrial size.     3.  The aortic valve is not well seen.  Doppler examination shows no aortic stenosis or insufficiency.  Aortic root normal size.     4.  Mild posterior mitral annular calcification.  Trace mitral regurgitation.     5.  Tricuspid valve is not well seen.  Trace tricuspid regurgitation.  Jet insufficient to calculate RVSP.  Pulmonic valve not well seen.  Normal inferior vena cava.     6.  No pericardial effusion.     No prior study for comparison.     24-hour Holter monitor .    Heart rate 42 to 109 bpm with average heart rate 65 bpm.  No atrial fibrillation.    Pharmacologic nuclear stress test 2-2017  Small area of decreased isotope uptake in the apex on resting images only consistent with artifact. Stress perfusion imaging is normal  without evidence of significant ischemia. Left ventricular function is hyperdynamic greater than 75%.       EKG 06/22/22  Sinus  Rhythm 66bpm [id number]  WITHIN NORMAL LIMITS                                           ASSESSMENT AND PLAN   Ms. Mendieta is a 71-year-old woman with suprapubic catheter and multiple urogynecologic issues.  Cardiac issues include:        Assessment/Plan    PAF (paroxysmal atrial fibrillation) (CMS/[facility])  The patient reports intraoperative atrial fibrillation during urologic surgery 6-2018.  She does not recall having any problems/atrial fibrillation  postoperatively.  She was seen by cardiology at that time, started on low-dose beta-blocker, but was not anticoagulated.    Holter monitor here 2017 has been unremarkable for atrial fibrillation.  Her EKG shows  sinus rhythm.  She has noted palpitations when she has been doing things but this is happened in the setting of weight gain and deconditioning.  Monitor for-2021 unremarkable for atrial fibrillation.    She does not require systemic anticoagulation at this time and especially with her ongoing issues with  thrombocytopenia, no further anticoagulation is recommended.  Risk of anticoagulation is much greater than benefit at this time        Essential hypertension  Lasix 20 mg daily losartan 100 mg daily atenolol 50 mg daily, amlodipine 5 mg daily.  Patient states her blood pressure is usually better.    Dyspnea on exertion  Currently no evidence of significant congestive heart failure.  Shortness of breath is multifactorial related also to weight and deconditioning.  She notes upper congestion with occasional productive cough but has a clear lung exam.  She plans on following up with pulmonary    She was offered follow-up in 6 months but wishes to return on an annual basis because the office is far for her.  Patient is vaccinated against COVID        I personally spent total 41 minutes face to face with the patient in counseling, review of records and discussion and/or coordination of care as described above.     Return in about 1 year (around 6/22/2023).   Thank you for allowing me to participate in the care of this patient.  I hope this information is helpful.         Lizette Sifuentes MD [facility]  6/22/2022  6:01 PM    This document was generated utilizing voice recognition technology. A reasonable attempt at proofreading has been made to minimize errors but please excuse any typographical errors which may be present. Please call with any questions.

## 2022-06-22 NOTE — ASSESSMENT & PLAN NOTE
Lasix 20 mg daily losartan 100 mg daily atenolol 50 mg daily, amlodipine 5 mg daily.  Patient states her blood pressure is usually better.

## 2022-06-22 NOTE — PROGRESS NOTES
Cardiology Outpatient Note    [facility]    [place] Office  [address] [place] [address]     [facility]  [facility] [address]  [address] [place] [address]     TEL  [phone]  [facility]/mlhc     Siria Mendieta is a 72 y.o. female who comes in for routine annual follow-up.  She has a number of complicated urogynecologic issues and previously had surgery with Dr. Maria E Ness at [facility] which has now closed.  She had removal of vaginal mesh and a vaginoplasty 6-2018 and at that time had intraoperative atrial fibrillation.She was told to see her cardiologist as an outpatient because she had atrial fibrillation.  She was seen by cardiologist during that admission but does not remember many of the details.  She was not placed on oral anticoagulation.  She was started on metoprolol 12.5 mg twice daily.  Subsequently 9-2018 she had to go back for another procedure. She did not have atrial fibrillation during her second procedure 9-2018.      She has not been in atrial fibrillation since that time.  She had a number of issues with anemia which appear to have resolved with removal of her vaginal mesh.  She is still thrombocytopenic.    She is now more hesitant about proceeding because she is got used to the indwelling catheter and has not had any issues.  She has not required a transfusion of packed red blood cells since September.  She has:    -History of vesicovaginal fistula with suprapubic catheter which is changed every 3 weeks.  Patient says sometimes this gets clogged but she has not had any recent infections    -History of C. 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  Last visit  and patient was deemed stable      Patient had been on Zetia but was told by her primary care physician that she did not require any longer because her lipid profile was good.     Overall her shortness of breath is improved compared to the past.  She says that she has an upper chest mucousy/congestion type feeling.  She is tried allergy medications without significant relief.  She does not have orthopnea or lower extremity edema.  No chest pain.  At times has productive cough.  No fevers.                                                           PMH     Medical History:  Past Medical History:   Diagnosis Date   • Abnormal ECG    • Diabetes (CMS/HCC)    • Fatty liver     with cirrohsis   • Female bladder prolapse    • Fibromyalgia    • Hypertension    • Lipid disorder    • Liver fibrosis    • Sleep apnea     CPAP       Surgical History:  Past Surgical History:   Procedure Laterality Date   • BLADDER SURGERY  2018   • CATARACT EXTRACTION, BILATERAL     • HIP FRACTURE SURGERY Right    • HYSTEROSCOPY     • INCONTINENCE SURGERY  10/2014   • REPLACEMENT TOTAL KNEE Right        Social History:  Social History     Tobacco Use   • Smoking status: Never Smoker   • Smokeless tobacco: Never Used   Vaping Use   • Vaping Use: Never used   Substance Use Topics   • Alcohol use: No   • Drug use: No       Family History: She indicated that her biological mother is . She indicated that her biological father is . She indicated that the status of her biological brother is unknown. She indicated that her other is alive.      Allergies:Sulfa (sulfonamide antibiotics), Acetaminophen, Aspirin, Keflex [cephalexin], Levofloxacin, Meperidine hcl, Oxycodone hcl, Penicillins, and Vancomycin    Current Medications:    Outpatient Encounter Medications as of 2022:   •  amLODIPine (NORVASC) 5 mg tablet, Take 1 tablet (5 mg  "total) by mouth daily.  •  armodafiniL (NUVIGIL) 150 mg tablet, Take 150 mg by mouth daily as needed.    •  aspirin/caffeine (KAYLEEN BACK AND BODY ORAL), Take 1 tablet by mouth 2 (two) times a day as needed.  •  atenoloL (TENORMIN) 50 mg tablet, Take 1 tablet (50 mg total) by mouth daily.  •  cholecalciferol, vitamin D3, 5,000 unit (125 mcg) tablet, Take 1 tablet by mouth daily.  •  escitalopram (LEXAPRO) 10 mg tablet, Take 10 mg by mouth once daily.  •  furosemide (LASIX) 20 mg tablet, Take 20 mg by mouth once daily.  •  gabapentin (NEURONTIN) 100 mg capsule, Take 1 capsule (100 mg total) by mouth 3 (three) times a day as needed (back pain).  •  losartan (COZAAR) 100 mg tablet, Take 1 tablet (100 mg total) by mouth daily.  •  naproxen (NAPROSYN) 500 mg tablet, Take 1 tablet by mouth daily.    •  omeprazole (PriLOSEC) 40 mg capsule, Take 1 capsule by mouth daily.  •  oxybutynin XL (DITROPAN XL) 15 mg 24 hr tablet, Take 15 mg by mouth daily.    •  vitamin E 400 unit capsule, Take 1 capsule by mouth daily.                                                          OBJECTIVE   ROS as in HPI   Constitution: Negative for chills and fever.   Eyes: Negative for blurred vision and visual disturbance.   Cardiovascular: Negative for anginal chest pain, dyspnea on exertion, near-syncope,   Respiratory: Negative for hemoptysis, shortness of breath and wheezing.  Upper chest congestion with occasional productive cough  Hematologic/Lymphatic: Negative for bleeding problem.   Skin: Negative for rash. No new skin changes  Gastrointestinal: Negative for abdominal pain, diarrhea, hematochezia, melena, nausea and vomiting.  Positive hiatal hernia  Genitourinary: Negative for dysuria and hematuria.  Suprapubic catheter  Neurological: Negative for headaches.    Vitals:    06/22/22 1538   BP: 140/80   BP Location: Left upper arm   Patient Position: Sitting   Pulse: 63   Resp: 18   Weight: 113 kg (248 lb 9.6 oz)   Height: 1.626 m (5' 4\") "       BP Readings from Last 3 Encounters:   06/22/22 140/80   04/07/21 (!) 154/70   01/24/20 120/70     Wt Readings from Last 3 Encounters:   06/22/22 113 kg (248 lb 9.6 oz)   04/07/21 111 kg (245 lb 3.2 oz)   01/24/20 97.7 kg (215 lb 6.4 oz)        Physical Exam   Constitutional: Appears comfortable in no distress  HEENT:  Neck Supple.  No JVD   Head: Normocephalic.   Eyes: Extraocular movements intact  Cardiovascular: Normal rate, regular rhythm 1 out of 6 systolic murmur left sternal border exam reveals no friction rub.    Pulmonary/Chest: Effort normal and breath sounds normal. No wheezes.  Abdominal: Soft and nontender.   Musculoskeletal: No lower extremity edema.   Neurological: Alert and oriented to person, place, and time.   Skin: Skin is warm and dry.   Psychiatric: Behavior is normal.   Objective   No results found for: CHOL  No results found for: HDL  No results found for: LDLCALC  No results found for: TRIG  No results found for: ALT, AST  No results found for: WBC, HGB, HCT, PLT, INR  No results found for: GLUCOSE, NA, K, CO2, CL, BUN, CREATININE  No results found for: HGBA1C  No results found for: TSH  No results found for: BNP  [unfilled]    Troponin I Results     No lab values to display.                                                             IMAGING   Monitor 4-2021 7 days 4 hours sinus rhythm 44 to 108 bpm  With average 61 bpm.  No atrial fibrillation.  1 episode atrial tachycardia 20 beats at 106 bpm noted.     Transthoracic echocardiogram 1-23-19  1.  Normal left ventricular size.  Endocardium not well seen cannot estimate wall thickness or wall motion abnormalities.  Left ventricular ejection fraction estimated at 65-70%.  Grade 1 diastolic dysfunction.  Mild left atrial dilatation.     2.  Normal right ventricular size and function.  Normal right atrial size.     3.  The aortic valve is not well seen.  Doppler examination shows no aortic stenosis or insufficiency.  Aortic root normal  size.     4.  Mild posterior mitral annular calcification.  Trace mitral regurgitation.     5.  Tricuspid valve is not well seen.  Trace tricuspid regurgitation.  Jet insufficient to calculate RVSP.  Pulmonic valve not well seen.  Normal inferior vena cava.     6.  No pericardial effusion.     No prior study for comparison.     24-hour Holter monitor .    Heart rate 42 to 109 bpm with average heart rate 65 bpm.  No atrial fibrillation.    Pharmacologic nuclear stress test 2-2017  Small area of decreased isotope uptake in the apex on resting images only consistent with artifact. Stress perfusion imaging is normal  without evidence of significant ischemia. Left ventricular function is hyperdynamic greater than 75%.       EKG 06/22/22  Sinus  Rhythm 66bpm [id number]  WITHIN NORMAL LIMITS                                           ASSESSMENT AND PLAN   Ms. Mendieta is a 71-year-old woman with suprapubic catheter and multiple urogynecologic issues.  Cardiac issues include:        Assessment/Plan    PAF (paroxysmal atrial fibrillation) (CMS/[facility])  The patient reports intraoperative atrial fibrillation during urologic surgery 6-2018.  She does not recall having any problems/atrial fibrillation  postoperatively.  She was seen by cardiology at that time, started on low-dose beta-blocker, but was not anticoagulated.    Holter monitor here 2017 has been unremarkable for atrial fibrillation.  Her EKG shows sinus rhythm.  She has noted palpitations when she has been doing things but this is happened in the setting of weight gain and deconditioning.  Monitor for-2021 unremarkable for atrial fibrillation.    She does not require systemic anticoagulation at this time and especially with her ongoing issues with  thrombocytopenia, no further anticoagulation is recommended.  Risk of anticoagulation is much greater than benefit at this time        Essential hypertension  Lasix 20 mg daily losartan 100 mg daily atenolol 50 mg daily,  amlodipine 5 mg daily.  Patient states her blood pressure is usually better.    Dyspnea on exertion  Currently no evidence of significant congestive heart failure.  Shortness of breath is multifactorial related also to weight and deconditioning.  She notes upper congestion with occasional productive cough but has a clear lung exam.  She plans on following up with pulmonary    She was offered follow-up in 6 months but wishes to return on an annual basis because the office is far for her.  Patient is vaccinated against COVID        I personally spent total 41 minutes face to face with the patient in counseling, review of records and discussion and/or coordination of care as described above.     Return in about 1 year (around 6/22/2023).   Thank you for allowing me to participate in the care of this patient.  I hope this information is helpful.         Lizette Sifuentes MD [facility]  6/22/2022  6:01 PM    This document was generated utilizing voice recognition technology. A reasonable attempt at proofreading has been made to minimize errors but please excuse any typographical errors which may be present. Please call with any questions.

## 2023-05-11 ENCOUNTER — TELEPHONE (OUTPATIENT)
Dept: CARDIOLOGY | Facility: CLINIC | Age: 73
End: 2023-05-11

## 2023-05-11 NOTE — TELEPHONE ENCOUNTER
Left voice mail for patient to reschedule her 6/21/23 with Dr Sifuentes. She is not available that day. Please schedule patient next available that works for her.  Thanks Alesha

## 2023-10-25 ENCOUNTER — OFFICE VISIT (OUTPATIENT)
Dept: CARDIOLOGY | Facility: CLINIC | Age: 73
End: 2023-10-25
Payer: COMMERCIAL

## 2023-10-25 VITALS
SYSTOLIC BLOOD PRESSURE: 128 MMHG | HEIGHT: 64 IN | HEART RATE: 61 BPM | WEIGHT: 236 LBS | DIASTOLIC BLOOD PRESSURE: 70 MMHG | BODY MASS INDEX: 40.29 KG/M2 | RESPIRATION RATE: 16 BRPM

## 2023-10-25 DIAGNOSIS — I10 ESSENTIAL HYPERTENSION: Primary | ICD-10-CM

## 2023-10-25 DIAGNOSIS — R07.9 CHEST PAIN, UNSPECIFIED TYPE: ICD-10-CM

## 2023-10-25 DIAGNOSIS — R06.09 DYSPNEA ON EXERTION: ICD-10-CM

## 2023-10-25 DIAGNOSIS — I48.0 PAF (PAROXYSMAL ATRIAL FIBRILLATION) (CMS/HCC): ICD-10-CM

## 2023-10-25 PROCEDURE — 3008F BODY MASS INDEX DOCD: CPT | Performed by: INTERNAL MEDICINE

## 2023-10-25 PROCEDURE — 3074F SYST BP LT 130 MM HG: CPT | Performed by: INTERNAL MEDICINE

## 2023-10-25 PROCEDURE — 93000 ELECTROCARDIOGRAM COMPLETE: CPT | Performed by: INTERNAL MEDICINE

## 2023-10-25 PROCEDURE — 3078F DIAST BP <80 MM HG: CPT | Performed by: INTERNAL MEDICINE

## 2023-10-25 PROCEDURE — 99214 OFFICE O/P EST MOD 30 MIN: CPT | Performed by: INTERNAL MEDICINE

## 2023-10-25 RX ORDER — BACLOFEN 5 MG/1
TABLET ORAL
COMMUNITY
Start: 2023-09-05

## 2023-10-25 RX ORDER — ATENOLOL 25 MG/1
25 TABLET ORAL DAILY
COMMUNITY
Start: 2023-09-05

## 2023-10-25 RX ORDER — SEMAGLUTIDE 0.68 MG/ML
INJECTION, SOLUTION SUBCUTANEOUS
COMMUNITY
Start: 2023-10-17

## 2023-10-25 RX ORDER — GRANULES FOR ORAL 3 G/1
3 POWDER ORAL ONCE
COMMUNITY

## 2023-10-25 RX ORDER — FLUTICASONE FUROATE, UMECLIDINIUM BROMIDE AND VILANTEROL TRIFENATATE 200; 62.5; 25 UG/1; UG/1; UG/1
POWDER RESPIRATORY (INHALATION)
COMMUNITY
Start: 2023-10-16

## 2023-10-25 RX ORDER — FOLIC ACID 1 MG/1
1000 TABLET ORAL DAILY
COMMUNITY
Start: 2023-09-09

## 2023-10-25 RX ORDER — OLMESARTAN MEDOXOMIL 20 MG/1
TABLET ORAL
COMMUNITY
Start: 2023-09-09

## 2023-10-25 NOTE — PROGRESS NOTES
Cardiology Outpatient Note    [facility]    [address] [place] [address]  [address] [facility] [place] [address]     [facility]  The Heart [address]  [address] [place] [address]     TEL  [phone]  [facility]/mlhc     Siria Mendieta is a 73 y.o. female who comes in for routine annual follow-up.  She has a number of complicated urogynecologic issues and previously had surgery with Dr. Maria E Ness at [facility] which has now closed.    She had removal of vaginal mesh and a vaginoplasty 6-2018 and at that time had intraoperative atrial fibrillation.She was told to see her cardiologist as an outpatient because she had atrial fibrillation.  She was seen by cardiologist during that admission but does not remember many of the details.  She was not placed on oral anticoagulation.  She was started on metoprolol 12.5 mg twice daily.  Subsequently 9-2018 she had to go back for another procedure. She did not have atrial fibrillation during her second procedure 9-2018.      She has not been in atrial fibrillation since that time.  She had a number of issues with anemia which appear to have resolved with removal of her vaginal mesh.  She is still thrombocytopenic.      She has:    -History of vesicovaginal fistula with suprapubic catheter which is changed every 3 weeks.  Patient says sometimes this gets clogged but she has not had any recent infections    -History of C. 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  Last visit 5-2022 and patient was deemed stable    She had pneumonia and subsequently COVID earlier this year.  In the  process she was diagnosed with asthma and says that was the reason for shortness of breath in the past.  She is no longer having any shortness of breath.  No chest pain or palpitations.  She has been started on Ozempic and has had occasional diarrhea but is losing weight.  She has more issues with arthritis especially of her left lower extremity.  Left hip and knee replacement has been recommended but she has been hesitant especially considering her ID issues.                                                         [facility]     Medical History:  Past Medical History:   Diagnosis Date    Abnormal ECG     Diabetes (CMS/HCC)     Fatty liver     with cirrohsis    Female bladder prolapse     Fibromyalgia     Hypertension     Lipid disorder     Liver fibrosis     Sleep apnea     CPAP       Surgical History:  Past Surgical History:   Procedure Laterality Date    BLADDER SURGERY  2018    CATARACT EXTRACTION, BILATERAL      HIP FRACTURE SURGERY Right     HYSTEROSCOPY      INCONTINENCE SURGERY  10/2014    REPLACEMENT TOTAL KNEE Right        Social History:  Social History     Tobacco Use    Smoking status: Never    Smokeless tobacco: Never   Vaping Use    Vaping Use: Never used   Substance Use Topics    Alcohol use: No    Drug use: No       Family History: She indicated that her biological mother is . She indicated that her biological father is . She indicated that the status of her biological brother is unknown. She indicated that her other is alive.      Allergies:Sulfa (sulfonamide antibiotics), Keflex [cephalexin], Levofloxacin, Meperidine hcl, Oxycodone hcl, Penicillins, and Vancomycin    Current Medications:    Outpatient Encounter Medications as of 10/25/2023:     armodafiniL (NUVIGIL) 150 mg tablet, Take 150 mg by mouth daily as needed.      aspirin/caffeine (KAYLEEN BACK AND BODY ORAL), Take 1 tablet by mouth 2 (two) times a day as needed.    atenoloL (TENORMIN) 25 mg tablet,  Take 25 mg by mouth daily.    baclofen (LIORESAL) 5 mg tablet tablet, TAKE 1-3 TABLETS BY MOUTH NIGHTLY AS NEEDED MUSCLE SPASM    cholecalciferol, vitamin D3, 5,000 unit (125 mcg) tablet, Take 1 tablet by mouth daily.    escitalopram (LEXAPRO) 10 mg tablet, Take 10 mg by mouth once daily.    folic acid (FOLVITE) 1 mg tablet, Take 1,000 mcg by mouth daily.    fosfomycin (MONUROL) 3 gram packet, Take 3 g by mouth once.    furosemide (LASIX) 20 mg tablet, Take 20 mg by mouth once daily.    naproxen (NAPROSYN) 500 mg tablet, Take 1 tablet by mouth daily.      olmesartan (BENICAR) 20 mg tablet, TAKE 1 TABLET (20 MG TOTAL) BY MOUTH IN THE MORNING.    omeprazole (PriLOSEC) 40 mg capsule, Take 1 capsule by mouth daily.    oxybutynin XL (DITROPAN XL) 15 mg 24 hr tablet, Take 15 mg by mouth daily.      OZEMPIC 0.25 mg or 0.5 mg (2 mg/3 mL) subcutaneous injection, INJECT 0.5 MG UNDER THE SKIN ONCE A WEEK    TRELEGY ELLIPTA 200-62.5-25 mcg blister with device powder for inhalation, INHALE 1 PUFF BY MOUTH EVERY DAY    vitamin E 400 unit capsule, Take 1 capsule by mouth daily.    amLODIPine (NORVASC) 5 mg tablet, Take 1 tablet (5 mg total) by mouth daily.    gabapentin (NEURONTIN) 100 mg capsule, Take 1 capsule (100 mg total) by mouth 3 (three) times a day as needed (back pain).    [DISCONTINUED] atenoloL (TENORMIN) 50 mg tablet, Take 1 tablet (50 mg total) by mouth daily.    [DISCONTINUED] losartan (COZAAR) 100 mg tablet, Take 1 tablet (100 mg total) by mouth daily.                                                          OBJECTIVE   ROS as in HPI   Constitution: Negative for chills and fever.   Eyes: Negative for blurred vision and visual disturbance.   Cardiovascular: Negative for anginal chest pain, dyspnea on exertion, near-syncope,   Respiratory: Negative for hemoptysis, shortness of breath and wheezing.  Chronic upper chest congestion and mucus production  Hematologic/Lymphatic: Negative for bleeding  "problem.   Skin: Negative for rash. No new skin changes  Gastrointestinal: Negative for abdominal pain, diarrhea, hematochezia, melena, nausea and vomiting.  Positive hiatal hernia  Genitourinary: Negative for dysuria and hematuria.  Suprapubic catheter  Neurological: Negative for headaches.    Vitals:    10/25/23 1453   BP: 128/70   BP Location: Right upper arm   Patient Position: Sitting   Pulse: 61   Resp: 16   Weight: 107 kg (236 lb)   Height: 1.626 m (5' 4\")       BP Readings from Last 3 Encounters:   10/25/23 128/70   06/22/22 140/80   04/07/21 (!) 154/70     Wt Readings from Last 3 Encounters:   10/25/23 107 kg (236 lb)   06/22/22 113 kg (248 lb 9.6 oz)   04/07/21 111 kg (245 lb 3.2 oz)        Physical Exam   Constitutional: Appears comfortable in no distress  HEENT:  Neck Supple.  No JVD   Head: Normocephalic.   Eyes: Extraocular movements intact  Cardiovascular: Normal rate, regular rhythm 1 out of 6 systolic murmur left sternal border exam reveals no friction rub.    Pulmonary/Chest: Effort normal and breath sounds normal. No wheezes.  Abdominal: Soft and nontender.   Musculoskeletal: No lower extremity edema.   Neurological: Alert and oriented to person, place, and time.   Skin: Skin is warm and dry.   Psychiatric: Behavior is normal.   Objective   No results found for: \"CHOL\"  No results found for: \"HDL\"  No results found for: \"LDLCALC\"  No results found for: \"TRIG\"  No results found for: \"ALT\", \"AST\"  No results found for: \"WBC\", \"HGB\", \"HCT\", \"PLT\", \"INR\"  No results found for: \"GLUCOSE\", \"NA\", \"K\", \"CO2\", \"CL\", \"BUN\", \"CREATININE\"  No results found for: \"HGBA1C\"  No results found for: \"TSH\"  No results found for: \"BNP\"  [unfilled]    Troponin I Results     No lab values to display.                                                             IMAGING   Monitor 4-2021 7 days 4 hours sinus rhythm 44 to 108 bpm  With average 61 bpm.  No atrial fibrillation.  1 episode atrial tachycardia 20 beats at 106 bpm " noted.     Transthoracic echocardiogram 1-23-19  1.  Normal left ventricular size.  Endocardium not well seen cannot estimate wall thickness or wall motion abnormalities.  Left ventricular ejection fraction estimated at 65-70%.  Grade 1 diastolic dysfunction.  Mild left atrial dilatation.     2.  Normal right ventricular size and function.  Normal right atrial size.     3.  The aortic valve is not well seen.  Doppler examination shows no aortic stenosis or insufficiency.  Aortic root normal size.     4.  Mild posterior mitral annular calcification.  Trace mitral regurgitation.     5.  Tricuspid valve is not well seen.  Trace tricuspid regurgitation.  Jet insufficient to calculate RVSP.  Pulmonic valve not well seen.  Normal inferior vena cava.     6.  No pericardial effusion.     No prior study for comparison.     24-hour Holter monitor .    Heart rate 42 to 109 bpm with average heart rate 65 bpm.  No atrial fibrillation.    Pharmacologic nuclear stress test 2-2017  Small area of decreased isotope uptake in the apex on resting images only consistent with artifact. Stress perfusion imaging is normal  without evidence of significant ischemia. Left ventricular function is hyperdynamic greater than 75%.       EKG 10/25/23  Sinus Rhythm 61bpm [id number]   -First degree A-V block Quentin = 256  Low voltage in precordial leads.                                             ASSESSMENT AND PLAN   Ms. Mendieta is a 73-year-old woman with suprapubic catheter and multiple urogynecologic issues.  Cardiac issues include:        Assessment/Plan    Essential hypertension  Amlodipine 5 mg daily atenolol 25 mg daily furosemide 20 mg daily olmesartan 20 mg daily    PAF (paroxysmal atrial fibrillation) (CMS/HCC)  Intraoperative afibrillation during urologic surgery 6-2018.  Patient was not anticoagulated at that time.  Holter monitor 2017 and 2021 unremarkable for atrial fibrillation.  She has had no further palpitations.  No atrial  fibrillation on EKG today.  She does not require systemic anticoagulation at this time and with her ongoing issues with thrombocytopenia risk of anticoagulation is thought to be greater than benefit.    Dyspnea on exertion  Has had ongoing issues with upper chest congestion and mucus production.  Currently no productive cough.  No evidence of acute coronary syndrome or congestive heart failure.  Has been treated for asthma and says this is helped her significantly.              Return in about 1 year (around 10/25/2024).   Thank you for allowing me to participate in the care of this patient.  I hope this information is helpful.         Lizette Sifuentes MD [facility]  10/25/2023  3:40 PM    This document was generated utilizing voice recognition technology. A reasonable attempt at proofreading has been made to minimize errors but please excuse any typographical errors which may be present. Please call with any questions.

## 2023-10-25 NOTE — LETTER
October 25, 2023     [place], [place]  [address] [facility] [address]    Patient: Siria Mendieta  YOB: 1950  Date of Visit: 10/25/2023      Dear Dr. Chaudhary:    Thank you for referring Siria Mendieta to me for evaluation. Below are my notes for this consultation.    If you have questions, please do not hesitate to call me. I look forward to following your patient along with you.         Sincerely,        Lizette Sifuentes MD        CC: No Recipients    Lizette Sifuentes MD  10/25/2023  3:41 PM  Signed       Cardiology Outpatient Note    [facility] Office  [address] [place] [address]     [facility]  The [facility]  [address] [place] [address]     TEL  [phone]  [facility]/mlhc     Siria Mendieta is a 73 y.o. female who comes in for routine annual follow-up.  She has a number of complicated urogynecologic issues and previously had surgery with Dr. Maria E Ness at [facility] which has now closed.    She had removal of vaginal mesh and a vaginoplasty 6-2018 and at that time had intraoperative atrial fibrillation.She was told to see her cardiologist as an outpatient because she had atrial fibrillation.  She was seen by cardiologist during that admission but does not remember many of the details.  She was not placed on oral anticoagulation.  She was started on metoprolol 12.5 mg twice daily.  Subsequently 9-2018 she had to go back for another procedure. She did not have atrial fibrillation during her second procedure 9-2018.      She has not been in atrial fibrillation since that time.  She had a number of issues with anemia which appear to have resolved with removal of her vaginal mesh.  She is still thrombocytopenic.      She has:    -History of vesicovaginal fistula with suprapubic catheter which is changed every 3 weeks.  Patient says sometimes this gets clogged but she has not had  any recent infections    -History of C. 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  Last visit  and patient was deemed stable    She had pneumonia and subsequently COVID earlier this year.  In the process she was diagnosed with asthma and says that was the reason for shortness of breath in the past.  She is no longer having any shortness of breath.  No chest pain or palpitations.  She has been started on Ozempic and has had occasional diarrhea but is losing weight.  She has more issues with arthritis especially of her left lower extremity.  Left hip and knee replacement has been recommended but she has been hesitant especially considering her ID issues.                                                         [facility]     Medical History:  Past Medical History:   Diagnosis Date    Abnormal ECG     Diabetes (CMS/HCC)     Fatty liver     with cirrohsis    Female bladder prolapse     Fibromyalgia     Hypertension     Lipid disorder     Liver fibrosis     Sleep apnea     CPAP       Surgical History:  Past Surgical History:   Procedure Laterality Date    BLADDER SURGERY  2018    CATARACT EXTRACTION, BILATERAL      HIP FRACTURE SURGERY Right     HYSTEROSCOPY      INCONTINENCE SURGERY  10/2014    REPLACEMENT TOTAL KNEE Right        Social History:  Social History     Tobacco Use    Smoking status: Never    Smokeless tobacco: Never   Vaping Use    Vaping Use: Never used   Substance Use Topics    Alcohol use: No    Drug use: No       Family History: She indicated that her biological mother is . She indicated that her biological father is . She indicated that the  status of her biological brother is unknown. She indicated that her other is alive.      Allergies:Sulfa (sulfonamide antibiotics), Keflex [cephalexin], Levofloxacin, Meperidine hcl, Oxycodone hcl, Penicillins, and Vancomycin    Current Medications:    Outpatient Encounter Medications as of 10/25/2023:     armodafiniL (NUVIGIL) 150 mg tablet, Take 150 mg by mouth daily as needed.      aspirin/caffeine (KAYLEEN BACK AND BODY ORAL), Take 1 tablet by mouth 2 (two) times a day as needed.    atenoloL (TENORMIN) 25 mg tablet, Take 25 mg by mouth daily.    baclofen (LIORESAL) 5 mg tablet tablet, TAKE 1-3 TABLETS BY MOUTH NIGHTLY AS NEEDED MUSCLE SPASM    cholecalciferol, vitamin D3, 5,000 unit (125 mcg) tablet, Take 1 tablet by mouth daily.    escitalopram (LEXAPRO) 10 mg tablet, Take 10 mg by mouth once daily.    folic acid (FOLVITE) 1 mg tablet, Take 1,000 mcg by mouth daily.    fosfomycin (MONUROL) 3 gram packet, Take 3 g by mouth once.    furosemide (LASIX) 20 mg tablet, Take 20 mg by mouth once daily.    naproxen (NAPROSYN) 500 mg tablet, Take 1 tablet by mouth daily.      olmesartan (BENICAR) 20 mg tablet, TAKE 1 TABLET (20 MG TOTAL) BY MOUTH IN THE MORNING.    omeprazole (PriLOSEC) 40 mg capsule, Take 1 capsule by mouth daily.    oxybutynin XL (DITROPAN XL) 15 mg 24 hr tablet, Take 15 mg by mouth daily.      OZEMPIC 0.25 mg or 0.5 mg (2 mg/3 mL) subcutaneous injection, INJECT 0.5 MG UNDER THE SKIN ONCE A WEEK    TRELEGY ELLIPTA 200-62.5-25 mcg blister with device powder for inhalation, INHALE 1 PUFF BY MOUTH EVERY DAY    vitamin E 400 unit capsule, Take 1 capsule by mouth daily.    amLODIPine (NORVASC) 5 mg tablet, Take 1 tablet (5 mg total) by mouth daily.    gabapentin (NEURONTIN) 100 mg capsule, Take 1 capsule (100 mg total) by mouth 3 (three) times a day as needed (back pain).    [DISCONTINUED] atenoloL (TENORMIN) 50 mg tablet, Take 1 tablet (50 mg total) by mouth daily.    [DISCONTINUED]  "losartan (COZAAR) 100 mg tablet, Take 1 tablet (100 mg total) by mouth daily.                                                          OBJECTIVE   ROS as in HPI   Constitution: Negative for chills and fever.   Eyes: Negative for blurred vision and visual disturbance.   Cardiovascular: Negative for anginal chest pain, dyspnea on exertion, near-syncope,   Respiratory: Negative for hemoptysis, shortness of breath and wheezing.  Chronic upper chest congestion and mucus production  Hematologic/Lymphatic: Negative for bleeding problem.   Skin: Negative for rash. No new skin changes  Gastrointestinal: Negative for abdominal pain, diarrhea, hematochezia, melena, nausea and vomiting.  Positive hiatal hernia  Genitourinary: Negative for dysuria and hematuria.  Suprapubic catheter  Neurological: Negative for headaches.    Vitals:    10/25/23 1453   BP: 128/70   BP Location: Right upper arm   Patient Position: Sitting   Pulse: 61   Resp: 16   Weight: 107 kg (236 lb)   Height: 1.626 m (5' 4\")       BP Readings from Last 3 Encounters:   10/25/23 128/70   06/22/22 140/80   04/07/21 (!) 154/70     Wt Readings from Last 3 Encounters:   10/25/23 107 kg (236 lb)   06/22/22 113 kg (248 lb 9.6 oz)   04/07/21 111 kg (245 lb 3.2 oz)        Physical Exam   Constitutional: Appears comfortable in no distress  HEENT:  Neck Supple.  No JVD   Head: Normocephalic.   Eyes: Extraocular movements intact  Cardiovascular: Normal rate, regular rhythm 1 out of 6 systolic murmur left sternal border exam reveals no friction rub.    Pulmonary/Chest: Effort normal and breath sounds normal. No wheezes.  Abdominal: Soft and nontender.   Musculoskeletal: No lower extremity edema.   Neurological: Alert and oriented to person, place, and time.   Skin: Skin is warm and dry.   Psychiatric: Behavior is normal.   Objective   No results found for: \"CHOL\"  No results found for: \"HDL\"  No results found for: \"LDLCALC\"  No results found for: \"TRIG\"  No results found " "for: \"ALT\", \"AST\"  No results found for: \"WBC\", \"HGB\", \"HCT\", \"PLT\", \"INR\"  No results found for: \"GLUCOSE\", \"NA\", \"K\", \"CO2\", \"CL\", \"BUN\", \"CREATININE\"  No results found for: \"HGBA1C\"  No results found for: \"TSH\"  No results found for: \"BNP\"  [unfilled]    Troponin I Results     No lab values to display.                                                             IMAGING   Monitor 4-2021 7 days 4 hours sinus rhythm 44 to 108 bpm  With average 61 bpm.  No atrial fibrillation.  1 episode atrial tachycardia 20 beats at 106 bpm noted.     Transthoracic echocardiogram 1-23-19  1.  Normal left ventricular size.  Endocardium not well seen cannot estimate wall thickness or wall motion abnormalities.  Left ventricular ejection fraction estimated at 65-70%.  Grade 1 diastolic dysfunction.  Mild left atrial dilatation.     2.  Normal right ventricular size and function.  Normal right atrial size.     3.  The aortic valve is not well seen.  Doppler examination shows no aortic stenosis or insufficiency.  Aortic root normal size.     4.  Mild posterior mitral annular calcification.  Trace mitral regurgitation.     5.  Tricuspid valve is not well seen.  Trace tricuspid regurgitation.  Jet insufficient to calculate RVSP.  Pulmonic valve not well seen.  Normal inferior vena cava.     6.  No pericardial effusion.     No prior study for comparison.     24-hour Holter monitor .    Heart rate 42 to 109 bpm with average heart rate 65 bpm.  No atrial fibrillation.    Pharmacologic nuclear stress test 2-2017  Small area of decreased isotope uptake in the apex on resting images only consistent with artifact. Stress perfusion imaging is normal  without evidence of significant ischemia. Left ventricular function is hyperdynamic greater than 75%.       EKG 10/25/23  Sinus Rhythm 61bpm [id number]   -First degree A-V block Quentin = 256  Low voltage in precordial leads.                                             ASSESSMENT AND PLAN   Ms. Mendieta " is a 73-year-old woman with suprapubic catheter and multiple urogynecologic issues.  Cardiac issues include:        Assessment/Plan    Essential hypertension  Amlodipine 5 mg daily atenolol 25 mg daily furosemide 20 mg daily olmesartan 20 mg daily    PAF (paroxysmal atrial fibrillation) (CMS/HCC)  Intraoperative afibrillation during urologic surgery 6-2018.  Patient was not anticoagulated at that time.  Holter monitor 2017 and 2021 unremarkable for atrial fibrillation.  She has had no further palpitations.  No atrial fibrillation on EKG today.  She does not require systemic anticoagulation at this time and with her ongoing issues with thrombocytopenia risk of anticoagulation is thought to be greater than benefit.    Dyspnea on exertion  Has had ongoing issues with upper chest congestion and mucus production.  Currently no productive cough.  No evidence of acute coronary syndrome or congestive heart failure.  Has been treated for asthma and says this is helped her significantly.             Return in about 1 year (around 10/25/2024).   Thank you for allowing me to participate in the care of this patient.  I hope this information is helpful.         Lizette Sifuentes MD [facility]  10/25/2023  3:40 PM    This document was generated utilizing voice recognition technology. A reasonable attempt at proofreading has been made to minimize errors but please excuse any typographical errors which may be present. Please call with any questions.

## 2023-10-25 NOTE — ASSESSMENT & PLAN NOTE
Intraoperative afibrillation during urologic surgery 6-2018.  Patient was not anticoagulated at that time.  Holter monitor 2017 and 2021 unremarkable for atrial fibrillation.  She has had no further palpitations.  No atrial fibrillation on EKG today.  She does not require systemic anticoagulation at this time and with her ongoing issues with thrombocytopenia risk of anticoagulation is thought to be greater than benefit.

## 2023-10-25 NOTE — ASSESSMENT & PLAN NOTE
Has had ongoing issues with upper chest congestion and mucus production.  Currently no productive cough.  No evidence of acute coronary syndrome or congestive heart failure.  Has been treated for asthma and says this is helped her significantly.

## 2024-06-19 ENCOUNTER — TELEPHONE (OUTPATIENT)
Dept: SCHEDULING | Facility: CLINIC | Age: 74
End: 2024-06-19
Payer: COMMERCIAL

## 2024-06-19 NOTE — TELEPHONE ENCOUNTER
Cardiac Clearance     Name of caller: Chantel      Relationship to patient: [facility]     Name of patient: Siria Mendieta    Insurance Name: [employer]     Name of physician: Lizette Sifuentes MD    Date of Procedure/Surgery: 8/20/2024     Type of Procedure/Surgery: L total hip replacement     Name of surgeon:     Office contact number: [phone]    Office fax number: [phone]

## 2024-06-20 NOTE — TELEPHONE ENCOUNTER
Spoke to Keyona- Scheduled appt for 8-7-2024 with Tonie for clearance for her Hip. Has an appt with Nolvia Chaudhary same day !!! THX

## 2024-08-07 ENCOUNTER — OFFICE VISIT (OUTPATIENT)
Dept: CARDIOLOGY | Facility: CLINIC | Age: 74
End: 2024-08-07
Payer: COMMERCIAL

## 2024-08-07 VITALS — OXYGEN SATURATION: 97 % | SYSTOLIC BLOOD PRESSURE: 128 MMHG | HEART RATE: 58 BPM | DIASTOLIC BLOOD PRESSURE: 74 MMHG

## 2024-08-07 DIAGNOSIS — R06.09 DYSPNEA ON EXERTION: ICD-10-CM

## 2024-08-07 DIAGNOSIS — I48.0 PAF (PAROXYSMAL ATRIAL FIBRILLATION) (CMS/HCC): ICD-10-CM

## 2024-08-07 DIAGNOSIS — Z01.810 PREOP CARDIOVASCULAR EXAM: Primary | ICD-10-CM

## 2024-08-07 DIAGNOSIS — I10 ESSENTIAL HYPERTENSION: ICD-10-CM

## 2024-08-07 LAB
ATRIAL RATE: 58
P AXIS: 84
PR INTERVAL: 276
QRS DURATION: 76
QT INTERVAL: 426
QTC CALCULATION(BAZETT): 418
R AXIS: 46
T WAVE AXIS: 48
VENTRICULAR RATE: 58

## 2024-08-07 PROCEDURE — 3078F DIAST BP <80 MM HG: CPT | Performed by: NURSE PRACTITIONER

## 2024-08-07 PROCEDURE — 99214 OFFICE O/P EST MOD 30 MIN: CPT | Performed by: NURSE PRACTITIONER

## 2024-08-07 PROCEDURE — 93000 ELECTROCARDIOGRAM COMPLETE: CPT | Performed by: NURSE PRACTITIONER

## 2024-08-07 PROCEDURE — 3074F SYST BP LT 130 MM HG: CPT | Performed by: NURSE PRACTITIONER

## 2024-08-07 ASSESSMENT — ENCOUNTER SYMPTOMS
ABDOMINAL PAIN: 0
SHORTNESS OF BREATH: 0
WEIGHT GAIN: 0
SNORING: 0
NEAR-SYNCOPE: 0
BLURRED VISION: 0
DYSPNEA ON EXERTION: 0
HEADACHES: 0
NERVOUS/ANXIOUS: 0
ORTHOPNEA: 0
NAUSEA: 0
ANOREXIA: 0
PALPITATIONS: 0
MYALGIAS: 0
PND: 0
NIGHT SWEATS: 0
DIZZINESS: 0
SYNCOPE: 0
HEARTBURN: 0
DISTURBANCES IN COORDINATION: 0
WEAKNESS: 0
WHEEZING: 0
DYSURIA: 0
COUGH: 0
IRREGULAR HEARTBEAT: 0
HEMATURIA: 0
CLAUDICATION: 0

## 2024-08-07 NOTE — ASSESSMENT & PLAN NOTE
Keyona denies anginal symptoms and her ECG is within normal limits.  She had an echocardiogram in February, 2024 which revealed normal LV function with an LVEF of 60% and no regional wall motion abnormalities.  She has a Class I Revised Cardiac Risk Index which correlates to a 3.9% 30 day risk of death, MI or cardiac arrest.  There is no cardiac contraindication to her proceeding with her surgery.  She is aware to not take exedrin or NSAIDS a week prior to her surgery.  She should hold her olmesartan the morning of surgery.

## 2024-08-07 NOTE — ASSESSMENT & PLAN NOTE
She had intraoperative atrial fibrillation during urologic surgery in June, 2018.  She has not had atrial fibrillation on holter monitoring and denies palpitations.

## 2024-08-07 NOTE — PROGRESS NOTES
HPI  Siria Mendieta presents to the office today, accompanied by her , for a pre-operative cardiovascular evaluation prior to total hip replacement.  She last saw Dr. Sifuentes on 10/25/2023 and since then admitted twice for pneumonia.  On 02/01/2024, she was admitted with persistent dyspnea and a productive cough.  She was treated with IV antibiotics, transitioned to po antibiotics.  She was readmitted on 02/26/2024, with left hip pain and difficulty walking.  A chest X-ray revealed a prior left midlung consolidation showing moderate interval improvement with small area of cavitation. There was an obscured left diaphragm implying acute left basilar atelectasis versus infiltrate.    She then underwent a CTA of the chest that was a suboptimal study.  Findings were suspicious for aspiration pneumonia as well as marked compressive atelectasis of the lung bases due to a large hiatal hernia.  It was recommended that she have a VQ scan.  This was done on 2/27/2024 and revealed multiple segmental and moderate subsegmental perfusion defects at the lung bases bilaterally concerning for pulmonary emboli.  Ultrasounds of bilateral lower extremities were negative for DVT.  Transthoracic echocardiogram was done on 2/28/2024 and revealed mild LVH.  There were no regional wall motion abnormalities.  Systolic function was normal with an EF of 60%.  There was mild aortic valve sclerosis without stenosis.  There was mitral annular and valvular calcification with no significant MR.  She was followed by Pulmonology and started on Eliquis for a period of 6 months.  She was discharged to a SNF.  Today, Keyona reports that she has been doing much better.  She was been following up with the pulmonologist and had a CTA of the chest on 08/01/2024 which revealed no PE; and resolution of previous left upper lobe consolidation.  Her Eliquis was stopped the end of July.  She denies complaints of chest pain, SOB or palpitations.  There is no  orthopnea or PND; she does get lower extremity edema and uses compression stockings and has compression pumps.  She is currently in a wheelchair due to her left hip pain and difficulty walking.       Medical History: has a past medical history of Abnormal ECG, Diabetes (CMS/HCC), Fatty liver, Female bladder prolapse, Fibromyalgia, Hypertension, Lipid disorder, Liver fibrosis, and Sleep apnea.    Surgical History: has a past surgical history that includes Incontinence surgery (10/2014); Replacement total knee (Right); Cataract extraction, bilateral; Hip fracture surgery (Right); Hysteroscopy; and Bladder surgery (2018).    Social History:  Social History     Tobacco Use    Smoking status: Never    Smokeless tobacco: Never   Vaping Use    Vaping Use: Never used   Substance Use Topics    Alcohol use: No    Drug use: No       Family History: She indicated that her biological mother is . She indicated that her biological father is . She indicated that the status of her biological brother is unknown. She indicated that her other is alive.           Allergies:Sulfa (sulfonamide antibiotics), Keflex [cephalexin], Levofloxacin, Meperidine hcl, Oxycodone hcl, Penicillins, and Vancomycin    Current Medications:  Current Outpatient Medications:     amLODIPine (NORVASC) 5 mg tablet, Take 1 tablet (5 mg total) by mouth daily., Disp: 90 tablet, Rfl: 1    armodafiniL (NUVIGIL) 150 mg tablet, Take 150 mg by mouth daily as needed.  , Disp: , Rfl:     aspirin/caffeine (KAYLEEN BACK AND BODY ORAL), Take 1 tablet by mouth 2 (two) times a day as needed., Disp: , Rfl:     atenoloL (TENORMIN) 25 mg tablet, Take 25 mg by mouth daily., Disp: , Rfl:     baclofen (LIORESAL) 5 mg tablet tablet, TAKE 1-3 TABLETS BY MOUTH NIGHTLY AS NEEDED MUSCLE SPASM, Disp: , Rfl:     cholecalciferol, vitamin D3, 5,000 unit (125 mcg) tablet, Take 1 tablet by mouth daily., Disp: , Rfl:     escitalopram (LEXAPRO) 10 mg tablet, Take 10 mg by  mouth once daily., Disp: , Rfl:     folic acid (FOLVITE) 1 mg tablet, Take 1,000 mcg by mouth daily., Disp: , Rfl:     fosfomycin (MONUROL) 3 gram packet, Take 3 g by mouth once., Disp: , Rfl:     furosemide (LASIX) 20 mg tablet, Take 20 mg by mouth once daily., Disp: , Rfl: 5    gabapentin (NEURONTIN) 100 mg capsule, Take 1 capsule (100 mg total) by mouth 3 (three) times a day as needed (back pain)., Disp: , Rfl:     olmesartan (BENICAR) 20 mg tablet, TAKE 1 TABLET (20 MG TOTAL) BY MOUTH IN THE MORNING., Disp: , Rfl:     omeprazole (PriLOSEC) 40 mg capsule, Take 1 capsule by mouth daily., Disp: , Rfl:     oxybutynin XL (DITROPAN XL) 15 mg 24 hr tablet, Take 15 mg by mouth daily.  , Disp: , Rfl: 3    OZEMPIC 0.25 mg or 0.5 mg (2 mg/3 mL) subcutaneous injection, INJECT 0.5 MG UNDER THE SKIN ONCE A WEEK, Disp: , Rfl:     TRELEGY ELLIPTA 200-62.5-25 mcg blister with device powder for inhalation, INHALE 1 PUFF BY MOUTH EVERY DAY, Disp: , Rfl:     vitamin E 400 unit capsule, Take 1 capsule by mouth daily., Disp: , Rfl:          Review of Systems   Constitutional: Negative for malaise/fatigue, night sweats and weight gain.   HENT:  Negative for nosebleeds.    Eyes:  Negative for blurred vision.   Cardiovascular:  Positive for leg swelling. Negative for chest pain, claudication, cyanosis, dyspnea on exertion, irregular heartbeat, near-syncope, orthopnea, palpitations, paroxysmal nocturnal dyspnea and syncope.   Respiratory:  Negative for cough, shortness of breath, snoring and wheezing.    Skin:  Negative for rash.   Musculoskeletal:  Positive for arthritis and joint pain. Negative for myalgias.   Gastrointestinal:  Negative for abdominal pain, anorexia, dysphagia, heartburn and nausea.   Genitourinary:  Negative for dysuria, hematuria and nocturia.        Suprapubic catheter   Neurological:  Negative for disturbances in coordination, dizziness, headaches and weakness.   Psychiatric/Behavioral:  The patient is not  nervous/anxious.    Allergic/Immunologic: Negative for HIV exposure.     Vitals:    08/07/24 1346 08/07/24 1349   BP: 130/72 128/74   BP Location: Right upper arm Left upper arm   Patient Position: Sitting Sitting   Pulse: (!) 58    SpO2: 97%      Physical Exam  Constitutional:       General: She is not in acute distress.     Appearance: Normal appearance. She is not ill-appearing or diaphoretic.   HENT:      Head: Normocephalic and atraumatic.   Cardiovascular:      Rate and Rhythm: Normal rate and regular rhythm.      Pulses: Normal pulses.   Pulmonary:      Effort: Pulmonary effort is normal. No respiratory distress.      Breath sounds: No wheezing, rhonchi or rales.   Abdominal:      Palpations: Abdomen is soft.   Musculoskeletal:      Cervical back: Normal range of motion.      Comments: In a wheelchair   Skin:     General: Skin is warm and dry.   Neurological:      Mental Status: She is alert and oriented to person, place, and time.   Psychiatric:         Mood and Affect: Mood normal.         Behavior: Behavior normal.         Thought Content: Thought content normal.         Judgment: Judgment normal.                ECG:  Sinus Bradycardia, 1st degree AVB, rate 58; Qtc. 418 msecs.    Preop cardiovascular exam  Keyona denies anginal symptoms and her ECG is within normal limits.  She had an echocardiogram in February, 2024 which revealed normal LV function with an LVEF of 60% and no regional wall motion abnormalities.  She has a Class I Revised Cardiac Risk Index which correlates to a 3.9% 30 day risk of death, MI or cardiac arrest.  There is no cardiac contraindication to her proceeding with her surgery.  She is aware to not take exedrin or NSAIDS a week prior to her surgery.  She should hold her olmesartan the morning of surgery.      Essential hypertension  Her BP is controlled in the office today.  She will continue atenolol 25 mg daily, olmesartan 20 mg daily and amlodipine 5 mg daily.  It was recommended  that she reduce her sodium intake.    PAF (paroxysmal atrial fibrillation) (CMS/HCC)  She had intraoperative atrial fibrillation during urologic surgery in June, 2018.  She has not had atrial fibrillation on holter monitoring and denies palpitations.           Keyona has a follow-up appointment with Dr. Sifuentes in October.  She will contact us in the interim with any problems.    I spent 30 minutes on this date of service performing the following activities: obtaining history, performing examination, entering orders, documenting, obtaining / reviewing records, and providing counseling and education.     JERROD Zimmerman   8/7/2024  3:06 PM

## 2024-08-07 NOTE — ASSESSMENT & PLAN NOTE
Her BP is controlled in the office today.  She will continue atenolol 25 mg daily, olmesartan 20 mg daily and amlodipine 5 mg daily.  It was recommended that she reduce her sodium intake.

## 2025-07-30 ENCOUNTER — TELEPHONE (OUTPATIENT)
Dept: SCHEDULING | Facility: CLINIC | Age: 75
End: 2025-07-30
Payer: COMMERCIAL

## 2025-07-30 NOTE — TELEPHONE ENCOUNTER
Cardiac Clearance     Name of caller: Lakia    Relationship to patient: [facility]    Name of patient: Siria Mendieta    Insurance Name: [employer]    Name of physician: Lizette Sifuentes MD    Date of Procedure/Surgery: 08/19    Type of Procedure/Surgery: laser lithotripsy of bladder stone     Name of surgeon: Dr. Paulo Fay    Office contact number: [phone]    Office fax number: [phone]    LOV 8/2024. An EKG is needed. Pt also comes in by stretcher     Please reach spouse Sivakumar [phone]  for appt

## 2025-08-05 NOTE — TELEPHONE ENCOUNTER
Pt calling back to schedule CCV.  Pt inquiring if appt can be over the [facility] or telemed. Pt is in a wheelchair and has to travel far for appts.    P [phone]

## 2025-08-05 NOTE — TELEPHONE ENCOUNTER
Called back to Keyona, got vm.  VM full and you cannot leave a message. It has been over a year since her last visit. She has to come into the office we cannot do a telemed. VALE

## 2025-08-18 ENCOUNTER — OFFICE VISIT (OUTPATIENT)
Dept: CARDIOLOGY | Facility: CLINIC | Age: 75
End: 2025-08-18
Payer: COMMERCIAL

## 2025-08-18 VITALS
SYSTOLIC BLOOD PRESSURE: 116 MMHG | OXYGEN SATURATION: 96 % | DIASTOLIC BLOOD PRESSURE: 62 MMHG | RESPIRATION RATE: 18 BRPM | HEART RATE: 72 BPM

## 2025-08-18 DIAGNOSIS — R60.0 BILATERAL LOWER EXTREMITY EDEMA: ICD-10-CM

## 2025-08-18 DIAGNOSIS — Z01.810 PREOPERATIVE CARDIOVASCULAR EXAMINATION: ICD-10-CM

## 2025-08-18 DIAGNOSIS — I35.0 NONRHEUMATIC AORTIC VALVE STENOSIS: ICD-10-CM

## 2025-08-18 DIAGNOSIS — I10 ESSENTIAL HYPERTENSION: ICD-10-CM

## 2025-08-18 DIAGNOSIS — I48.0 PAF (PAROXYSMAL ATRIAL FIBRILLATION) (CMS/HCC): Primary | ICD-10-CM

## 2025-08-18 DIAGNOSIS — Z86.711 HISTORY OF PULMONARY EMBOLISM: ICD-10-CM

## 2025-08-18 LAB
ATRIAL RATE: 64
P AXIS: 61
PR INTERVAL: 282
QRS DURATION: 90
QT INTERVAL: 414
QTC CALCULATION(BAZETT): 427
R AXIS: 12
T WAVE AXIS: 23
VENTRICULAR RATE: 64

## 2025-08-18 PROCEDURE — 99214 OFFICE O/P EST MOD 30 MIN: CPT | Performed by: NURSE PRACTITIONER

## 2025-08-18 PROCEDURE — 93000 ELECTROCARDIOGRAM COMPLETE: CPT | Performed by: NURSE PRACTITIONER

## 2025-08-18 PROCEDURE — G2211 COMPLEX E/M VISIT ADD ON: HCPCS | Performed by: NURSE PRACTITIONER

## 2025-08-18 PROCEDURE — 3078F DIAST BP <80 MM HG: CPT | Performed by: NURSE PRACTITIONER

## 2025-08-18 PROCEDURE — 3074F SYST BP LT 130 MM HG: CPT | Performed by: NURSE PRACTITIONER

## 2025-08-18 RX ORDER — BUPRENORPHINE 10 UG/H
1 PATCH TRANSDERMAL WEEKLY
COMMUNITY

## 2025-08-18 ASSESSMENT — ENCOUNTER SYMPTOMS
DIZZINESS: 0
PALPITATIONS: 0
DEPRESSION: 0
WEIGHT LOSS: 0
MEMORY LOSS: 0
SNORING: 0
BACK PAIN: 0
DYSPNEA ON EXERTION: 0
CLAUDICATION: 0
SHORTNESS OF BREATH: 0
WEIGHT GAIN: 0
COUGH: 0
HEARTBURN: 0
DIAPHORESIS: 0
NAUSEA: 0